# Patient Record
Sex: FEMALE | Race: WHITE | NOT HISPANIC OR LATINO | Employment: OTHER | URBAN - METROPOLITAN AREA
[De-identification: names, ages, dates, MRNs, and addresses within clinical notes are randomized per-mention and may not be internally consistent; named-entity substitution may affect disease eponyms.]

---

## 2019-02-20 LAB
CALCIUM SERPL-MCNC: 8.8 MG/DL (ref 8.5–10.1)
CHLORIDE SERPL-SCNC: 105 MMOL/L (ref 98–107)
CO2 SERPL-SCNC: 25 MMOL/L (ref 21–32)
POTASSIUM SERPL-SCNC: 4.1 MMOL/L (ref 3.5–5.1)
SODIUM SERPL-SCNC: 138 MEQ/L (ref 131–145)

## 2019-03-15 LAB
BUN SERPL-MCNC: 12 MG/DL (ref 7–18)
CREAT SERPL-MCNC: 0.8 MG/DL (ref 0.6–1.3)
GLUCOSE SERPL-MCNC: 85 MG/DL (ref 74–106)

## 2022-04-09 ENCOUNTER — HISTORICAL (OUTPATIENT)
Dept: ADMINISTRATIVE | Facility: HOSPITAL | Age: 87
End: 2022-04-09

## 2022-04-25 VITALS
OXYGEN SATURATION: 98 % | HEIGHT: 64 IN | WEIGHT: 132.63 LBS | SYSTOLIC BLOOD PRESSURE: 139 MMHG | BODY MASS INDEX: 22.64 KG/M2 | DIASTOLIC BLOOD PRESSURE: 76 MMHG

## 2022-09-15 ENCOUNTER — HISTORICAL (OUTPATIENT)
Dept: ADMINISTRATIVE | Facility: HOSPITAL | Age: 87
End: 2022-09-15

## 2023-01-30 DIAGNOSIS — R13.14 DYSPHAGIA, PHARYNGOESOPHAGEAL PHASE: Primary | ICD-10-CM

## 2023-01-31 ENCOUNTER — HOSPITAL ENCOUNTER (OUTPATIENT)
Dept: RADIOLOGY | Facility: HOSPITAL | Age: 88
Discharge: HOME | End: 2023-01-31
Attending: NURSE PRACTITIONER
Payer: MEDICARE

## 2023-01-31 DIAGNOSIS — R13.14 DYSPHAGIA, PHARYNGOESOPHAGEAL PHASE: ICD-10-CM

## 2023-01-31 PROCEDURE — A9698 NON-RAD CONTRAST MATERIALNOC: HCPCS | Performed by: NURSE PRACTITIONER

## 2023-01-31 PROCEDURE — 25500020 PHARM REV CODE 255: Performed by: NURSE PRACTITIONER

## 2023-01-31 PROCEDURE — 74220 X-RAY XM ESOPHAGUS 1CNTRST: CPT | Mod: TC

## 2023-01-31 RX ADMIN — BARIUM SULFATE 50 ML: 980 POWDER, FOR SUSPENSION ORAL at 11:01

## 2023-01-31 RX ADMIN — BARIUM SULFATE 50 ML: 0.6 SUSPENSION ORAL at 11:01

## 2023-06-17 ENCOUNTER — OFFICE VISIT (OUTPATIENT)
Dept: URGENT CARE | Facility: CLINIC | Age: 88
End: 2023-06-17
Payer: MEDICARE

## 2023-06-17 VITALS
WEIGHT: 145 LBS | BODY MASS INDEX: 25.69 KG/M2 | TEMPERATURE: 98 F | OXYGEN SATURATION: 95 % | HEART RATE: 73 BPM | HEIGHT: 63 IN | SYSTOLIC BLOOD PRESSURE: 138 MMHG | DIASTOLIC BLOOD PRESSURE: 83 MMHG | RESPIRATION RATE: 20 BRPM

## 2023-06-17 DIAGNOSIS — L29.9 ITCHING: Primary | ICD-10-CM

## 2023-06-17 PROCEDURE — 96372 PR INJECTION,THERAP/PROPH/DIAG2ST, IM OR SUBCUT: ICD-10-PCS | Mod: ,,,

## 2023-06-17 PROCEDURE — 99203 OFFICE O/P NEW LOW 30 MIN: CPT | Mod: 25,,,

## 2023-06-17 PROCEDURE — 96372 THER/PROPH/DIAG INJ SC/IM: CPT | Mod: ,,,

## 2023-06-17 PROCEDURE — 99203 PR OFFICE/OUTPT VISIT, NEW, LEVL III, 30-44 MIN: ICD-10-PCS | Mod: 25,,,

## 2023-06-17 RX ORDER — CYCLOBENZAPRINE HCL 10 MG
10 TABLET ORAL 2 TIMES DAILY PRN
COMMUNITY

## 2023-06-17 RX ORDER — TRAZODONE HYDROCHLORIDE 50 MG/1
TABLET ORAL
COMMUNITY
Start: 2023-03-27 | End: 2023-08-14

## 2023-06-17 RX ORDER — PANTOPRAZOLE SODIUM 40 MG/1
40 TABLET, DELAYED RELEASE ORAL 2 TIMES DAILY
COMMUNITY
Start: 2023-04-10

## 2023-06-17 RX ORDER — MIRTAZAPINE 15 MG/1
15 TABLET, FILM COATED ORAL NIGHTLY
COMMUNITY
Start: 2023-04-26

## 2023-06-17 RX ORDER — FLUTICASONE PROPIONATE 50 MCG
2 SPRAY, SUSPENSION (ML) NASAL
COMMUNITY
Start: 2023-04-15

## 2023-06-17 RX ORDER — TRAMADOL HYDROCHLORIDE 50 MG/1
50 TABLET ORAL DAILY PRN
COMMUNITY

## 2023-06-17 RX ORDER — SUCRALFATE 1 G/10ML
SUSPENSION ORAL
COMMUNITY
Start: 2023-02-07 | End: 2023-08-14

## 2023-06-17 RX ORDER — HYDROXYZINE PAMOATE 25 MG/1
25 CAPSULE ORAL EVERY 8 HOURS PRN
Qty: 9 CAPSULE | Refills: 0 | Status: SHIPPED | OUTPATIENT
Start: 2023-06-17 | End: 2023-06-20

## 2023-06-17 RX ORDER — DICLOFENAC SODIUM 75 MG/1
75 TABLET, DELAYED RELEASE ORAL 2 TIMES DAILY
COMMUNITY
Start: 2023-03-25

## 2023-06-17 RX ORDER — DOXYCYCLINE 100 MG/1
100 CAPSULE ORAL 2 TIMES DAILY
COMMUNITY
Start: 2023-06-09 | End: 2023-08-14

## 2023-06-17 RX ORDER — DEXAMETHASONE SODIUM PHOSPHATE 100 MG/10ML
5 INJECTION INTRAMUSCULAR; INTRAVENOUS ONCE
Status: COMPLETED | OUTPATIENT
Start: 2023-06-17 | End: 2023-06-17

## 2023-06-17 RX ORDER — LEVOTHYROXINE SODIUM 50 UG/1
50 TABLET ORAL
COMMUNITY
End: 2023-08-14

## 2023-06-17 RX ORDER — MEMANTINE HYDROCHLORIDE 10 MG/1
10 TABLET ORAL 2 TIMES DAILY
COMMUNITY
Start: 2023-05-11

## 2023-06-17 RX ORDER — VALACYCLOVIR HYDROCHLORIDE 1 G/1
TABLET, FILM COATED ORAL
COMMUNITY
Start: 2023-01-27 | End: 2023-08-14

## 2023-06-17 RX ADMIN — DEXAMETHASONE SODIUM PHOSPHATE 5 MG: 100 INJECTION INTRAMUSCULAR; INTRAVENOUS at 11:06

## 2023-06-17 NOTE — PATIENT INSTRUCTIONS
She given steroid shot while in clinic.    Vistaril every 8 hours as needed for itching.  Monitor blood pressure while taking this and do not drive or do any strenuous activities as this may make you sleepy.     Go directly to emergency room with any throat swelling, shortness of breath or other worrisome symptoms.

## 2023-06-17 NOTE — PROGRESS NOTES
"Subjective:      Patient ID: Karina Chun is a 89 y.o. female.    Vitals:  height is 5' 3" (1.6 m) and weight is 65.8 kg (145 lb). Her oral temperature is 97.8 °F (36.6 °C). Her blood pressure is 138/83 and her pulse is 73. Her respiration is 20 and oxygen saturation is 95%.     Chief Complaint: Allergic Reaction (Itching all over from shingle vaccination on Monday)    Patient is an 89-year-old female brought in by daughter this states she is itching all over from her shingles vaccine given to her yesterday.  Denies any shortness of breath, throat swelling, rash at this time.     Allergic Reaction  Pertinent negatives include no rash.     Skin:  Negative for rash and hives.   Allergic/Immunologic: Negative for hives.    Objective:     Physical Exam   Constitutional: She is oriented to person, place, and time.   HENT:   Head: Normocephalic.   Nose: Nose normal.   Mouth/Throat: Mucous membranes are moist. No posterior oropharyngeal erythema. Oropharynx is clear.   Cardiovascular: Normal rate and normal pulses.   Pulmonary/Chest: Effort normal.   Abdominal: Normal appearance.   Neurological: She is alert and oriented to person, place, and time.   Skin: Skin is warm and no rash. Capillary refill takes less than 2 seconds.   Psychiatric: Her behavior is normal. Mood, judgment and thought content normal.     Assessment:     1. Itching        Plan:       Itching  -     hydrOXYzine pamoate (VISTARIL) 25 MG Cap; Take 1 capsule (25 mg total) by mouth every 8 (eight) hours as needed (itching).  Dispense: 9 capsule; Refill: 0    Other orders  -     dexAMETHasone injection 5 mg      She given steroid shot while in clinic.    Vistaril every 8 hours as needed for itching.  Monitor blood pressure while taking this and do not drive or do any strenuous activities as this may make you sleepy.     Go directly to emergency room with any throat swelling, shortness of breath or other worrisome symptoms.              "

## 2023-07-20 DIAGNOSIS — D47.2 MONOCLONAL GAMMOPATHY: Primary | ICD-10-CM

## 2023-07-24 DIAGNOSIS — D47.2 MONOCLONAL GAMMOPATHY: Primary | ICD-10-CM

## 2023-08-08 NOTE — PROGRESS NOTES
Subjective:       Patient ID: Karina Chun is a 89 y.o. female.    Chief Complaint: Abnormal bloodwork    Diagnosis: Monoclonal gammopathy    Current Treatment: New patient visit    Clinical History:  Patient had a wellness visit 5/23/23.  Laboratory testing showed mild anemia with a hemoglobin of 10.8 and hematocrit 33.8 with normal red cell indices.  Total protein was elevated at 8.7 with a globulin fraction of 4.7.  BUN 18 and creatinine 1.12, serum calcium 9.0.  B12 and serum ferritin levels were normal.  SPEP drawn 6/16/23 showed a monoclonal spike in the gamma region measuring 2.3 gm/dL.  No IEP or quantitative immunoglobulins done.    She presents today for a hematology evaluation accompanied by her , daughter and son.  She lives in assisted living with her .  She has significant dementia and cannot give reliable history.  Family reports no significant illnesses or infections within the recent past.  No appetite changes or weight loss.  No complaints of bone pain.    Interval History  New patient visit    Review of Systems   Constitutional:  Negative for appetite change, fatigue, fever and unexpected weight change.   HENT:  Negative for mouth sores, sore throat and trouble swallowing.    Eyes: Negative.    Respiratory:  Negative for cough and shortness of breath.    Cardiovascular:  Negative for chest pain, palpitations and leg swelling.   Gastrointestinal:  Negative for abdominal distention, abdominal pain, constipation, diarrhea, nausea and vomiting.   Genitourinary:  Negative for dysuria, frequency and urgency.   Musculoskeletal:  Positive for arthralgias. Negative for back pain.   Integumentary:  Negative for pallor and rash.   Neurological:  Positive for memory loss. Negative for dizziness, weakness, numbness and headaches.   Hematological:  Negative for adenopathy. Does not bruise/bleed easily.   Psychiatric/Behavioral: Negative.         PMHx:  Hypothyroidism, osteoarthritis, migraines,  "dementia, CRI  PSHx:  Tonsils  SH:  Lifetime nonsmoker, no significant alcohol use.  Lives in assisted living in [place] with her .  FH:  No family history of cancer.    Objective:        BP (!) 131/91   Pulse 79   Temp 98 °F (36.7 °C)   Resp 14   Ht 5' 7" (1.702 m)   Wt 73.6 kg (162 lb 4.8 oz)   SpO2 95%   BMI 25.42 kg/m²    Physical Exam  Constitutional:       Comments: Elderly, well-developed white female in NAD, ambulatory without assistance   HENT:      Head: Normocephalic.      Mouth/Throat:      Mouth: Mucous membranes are moist.      Pharynx: Oropharynx is clear. No posterior oropharyngeal erythema.   Eyes:      Extraocular Movements: Extraocular movements intact.      Conjunctiva/sclera: Conjunctivae normal.      Pupils: Pupils are equal, round, and reactive to light.   Cardiovascular:      Rate and Rhythm: Normal rate and regular rhythm.      Heart sounds: No murmur heard.  Pulmonary:      Comments: Lungs clear to auscultation  Abdominal:      General: Bowel sounds are normal. There is no distension.      Palpations: Abdomen is soft.      Tenderness: There is no abdominal tenderness.      Comments: No palpable masses or organomegaly   Musculoskeletal:         General: Swelling (Trace ankle edema) present. No tenderness. Normal range of motion.      Cervical back: Neck supple. No tenderness.   Lymphadenopathy:      Cervical: No cervical adenopathy.   Skin:     General: Skin is warm and dry.      Findings: No rash.   Neurological:      General: No focal deficit present.      Mental Status: She is alert and oriented to person, place, and time.      Cranial Nerves: No cranial nerve deficit.      Motor: No weakness.       ECOG SCORE    1 - Restricted in strenuous activity-ambulatory and able to carry out work of a light nature          LABORATORY  Recent Results (from the past 168 hour(s))   Beta-2 Microglobulin, Serum    Collection Time: 08/14/23  2:19 PM   Result Value Ref Range    Beta 2 " Microglobulin 2.780 (H) 0.970 - 2.640 mg/L   CBC with Differential    Collection Time: 08/14/23  2:19 PM   Result Value Ref Range    WBC 5.60 4.50 - 11.50 x10(3)/mcL    RBC 3.74 (L) 4.20 - 5.40 x10(6)/mcL    Hgb 11.1 (L) 12.0 - 16.0 g/dL    Hct 34.8 (L) 37.0 - 47.0 %    MCV 93.0 80.0 - 94.0 fL    MCH 29.7 27.0 - 31.0 pg    MCHC 31.9 (L) 33.0 - 36.0 g/dL    RDW 13.8 11.5 - 17.0 %    Platelet 235 130 - 400 x10(3)/mcL    MPV 8.5 7.4 - 10.4 fL    Neut % 49.6 %    Lymph % 41.3 %    Mono % 7.1 %    Eos % 1.4 %    Basophil % 0.4 %    Lymph # 2.31 0.6 - 4.6 x10(3)/mcL    Neut # 2.78 2.1 - 9.2 x10(3)/mcL    Mono # 0.40 0.1 - 1.3 x10(3)/mcL    Eos # 0.08 0 - 0.9 x10(3)/mcL    Baso # 0.02 <=0.2 x10(3)/mcL    IG# 0.01 0 - 0.04 x10(3)/mcL    IG% 0.2 %        Assessment:   Monoclonal gammopathy  Anemia of chronic renal disease  Chronic renal insufficiency      Plan:   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  Additional laboratory drawn today for IEP, quantitative immunoglobulins and free serum light chains.  I will notify her son of the results when finalized.  The family does not want to pursue aggressive workup including bone marrow aspiration and biopsy.  She does not have any evidence of progressive renal insufficiency, anemia, hypercalcemia or bone pain.  As long as her laboratory testing is acceptable, will anticipate follow-up testing in 4 months.  The treatment plan was reviewed and discussed with the patient and her family in detail.  All questions were answered.      LARS DIOR MD    Other Physicians  Dr. Juli Logan

## 2023-08-14 ENCOUNTER — OFFICE VISIT (OUTPATIENT)
Dept: HEMATOLOGY/ONCOLOGY | Facility: CLINIC | Age: 88
End: 2023-08-14
Payer: MEDICARE

## 2023-08-14 ENCOUNTER — TELEPHONE (OUTPATIENT)
Dept: HEMATOLOGY/ONCOLOGY | Facility: CLINIC | Age: 88
End: 2023-08-14
Payer: MEDICARE

## 2023-08-14 VITALS
RESPIRATION RATE: 14 BRPM | DIASTOLIC BLOOD PRESSURE: 91 MMHG | WEIGHT: 162.31 LBS | OXYGEN SATURATION: 95 % | SYSTOLIC BLOOD PRESSURE: 131 MMHG | HEIGHT: 67 IN | HEART RATE: 79 BPM | TEMPERATURE: 98 F | BODY MASS INDEX: 25.47 KG/M2

## 2023-08-14 DIAGNOSIS — D47.2 MONOCLONAL GAMMOPATHY: Primary | ICD-10-CM

## 2023-08-14 LAB
ALBUMIN SERPL-MCNC: 3.8 G/DL (ref 3.4–4.8)
ALBUMIN/GLOB SERPL: 0.8 RATIO (ref 1.1–2)
ALP SERPL-CCNC: 77 UNIT/L (ref 40–150)
ALT SERPL-CCNC: 14 UNIT/L (ref 0–55)
AST SERPL-CCNC: 20 UNIT/L (ref 5–34)
B2 MICROGLOB SERPL-MCNC: 2.78 MG/L (ref 0.97–2.64)
BASOPHILS # BLD AUTO: 0.02 X10(3)/MCL
BASOPHILS NFR BLD AUTO: 0.4 %
BILIRUB SERPL-MCNC: 0.6 MG/DL
BUN SERPL-MCNC: 18.4 MG/DL (ref 9.8–20.1)
CALCIUM SERPL-MCNC: 9.4 MG/DL (ref 8.4–10.2)
CHLORIDE SERPL-SCNC: 108 MMOL/L (ref 98–107)
CO2 SERPL-SCNC: 25 MMOL/L (ref 23–31)
CREAT SERPL-MCNC: 0.96 MG/DL (ref 0.55–1.02)
EOSINOPHIL # BLD AUTO: 0.08 X10(3)/MCL (ref 0–0.9)
EOSINOPHIL NFR BLD AUTO: 1.4 %
ERYTHROCYTE [DISTWIDTH] IN BLOOD BY AUTOMATED COUNT: 13.8 % (ref 11.5–17)
GFR SERPLBLD CREATININE-BSD FMLA CKD-EPI: 57 MLS/MIN/1.73/M2
GLOBULIN SER-MCNC: 4.6 GM/DL (ref 2.4–3.5)
GLUCOSE SERPL-MCNC: 88 MG/DL (ref 82–115)
HCT VFR BLD AUTO: 34.8 % (ref 37–47)
HGB BLD-MCNC: 11.1 G/DL (ref 12–16)
IGA SERPL-MCNC: 10 MG/DL (ref 69–517)
IGG SERPL-MCNC: 3438 MG/DL (ref 522–1631)
IGM SERPL-MCNC: 26 MG/DL (ref 33–293)
IMM GRANULOCYTES # BLD AUTO: 0.01 X10(3)/MCL (ref 0–0.04)
IMM GRANULOCYTES NFR BLD AUTO: 0.2 %
LYMPHOCYTES # BLD AUTO: 2.31 X10(3)/MCL (ref 0.6–4.6)
LYMPHOCYTES NFR BLD AUTO: 41.3 %
MCH RBC QN AUTO: 29.7 PG (ref 27–31)
MCHC RBC AUTO-ENTMCNC: 31.9 G/DL (ref 33–36)
MCV RBC AUTO: 93 FL (ref 80–94)
MONOCYTES # BLD AUTO: 0.4 X10(3)/MCL (ref 0.1–1.3)
MONOCYTES NFR BLD AUTO: 7.1 %
NEUTROPHILS # BLD AUTO: 2.78 X10(3)/MCL (ref 2.1–9.2)
NEUTROPHILS NFR BLD AUTO: 49.6 %
PLATELET # BLD AUTO: 235 X10(3)/MCL (ref 130–400)
PMV BLD AUTO: 8.5 FL (ref 7.4–10.4)
POTASSIUM SERPL-SCNC: 4.1 MMOL/L (ref 3.5–5.1)
PROT SERPL-MCNC: 8.4 GM/DL (ref 5.8–7.6)
RBC # BLD AUTO: 3.74 X10(6)/MCL (ref 4.2–5.4)
SODIUM SERPL-SCNC: 141 MMOL/L (ref 136–145)
WBC # SPEC AUTO: 5.6 X10(3)/MCL (ref 4.5–11.5)

## 2023-08-14 PROCEDURE — 85025 COMPLETE CBC W/AUTO DIFF WBC: CPT | Performed by: INTERNAL MEDICINE

## 2023-08-14 PROCEDURE — 99999 PR PBB SHADOW E&M-EST. PATIENT-LVL V: CPT | Mod: PBBFAC,,, | Performed by: INTERNAL MEDICINE

## 2023-08-14 PROCEDURE — 82784 ASSAY IGA/IGD/IGG/IGM EACH: CPT | Performed by: INTERNAL MEDICINE

## 2023-08-14 PROCEDURE — 99999 PR PBB SHADOW E&M-EST. PATIENT-LVL V: ICD-10-PCS | Mod: PBBFAC,,, | Performed by: INTERNAL MEDICINE

## 2023-08-14 PROCEDURE — 83521 IG LIGHT CHAINS FREE EACH: CPT | Performed by: INTERNAL MEDICINE

## 2023-08-14 PROCEDURE — 99204 PR OFFICE/OUTPT VISIT, NEW, LEVL IV, 45-59 MIN: ICD-10-PCS | Mod: S$PBB,,, | Performed by: INTERNAL MEDICINE

## 2023-08-14 PROCEDURE — 86146 BETA-2 GLYCOPROTEIN ANTIBODY: CPT | Performed by: INTERNAL MEDICINE

## 2023-08-14 PROCEDURE — 99215 OFFICE O/P EST HI 40 MIN: CPT | Mod: PBBFAC | Performed by: INTERNAL MEDICINE

## 2023-08-14 PROCEDURE — 84165 PROTEIN E-PHORESIS SERUM: CPT | Performed by: INTERNAL MEDICINE

## 2023-08-14 PROCEDURE — 86334 IMMUNOFIX E-PHORESIS SERUM: CPT | Performed by: INTERNAL MEDICINE

## 2023-08-14 PROCEDURE — 80053 COMPREHEN METABOLIC PANEL: CPT | Performed by: INTERNAL MEDICINE

## 2023-08-14 PROCEDURE — 99204 OFFICE O/P NEW MOD 45 MIN: CPT | Mod: S$PBB,,, | Performed by: INTERNAL MEDICINE

## 2023-08-14 PROCEDURE — 36415 COLL VENOUS BLD VENIPUNCTURE: CPT | Performed by: INTERNAL MEDICINE

## 2023-08-14 RX ORDER — MAGNESIUM 30 MG
TABLET ORAL ONCE
COMMUNITY

## 2023-08-14 RX ORDER — SUMATRIPTAN SUCCINATE 25 MG/1
50 TABLET ORAL
COMMUNITY

## 2023-08-14 RX ORDER — WITCH HAZEL 50 %
2000 PADS, MEDICATED (EA) TOPICAL DAILY
COMMUNITY

## 2023-08-14 RX ORDER — LEVOCETIRIZINE DIHYDROCHLORIDE 5 MG/1
5 TABLET, FILM COATED ORAL NIGHTLY
COMMUNITY

## 2023-08-14 RX ORDER — ACETAMINOPHEN 500 MG
5000 TABLET ORAL DAILY
COMMUNITY

## 2023-08-14 RX ORDER — KRILL/OM-3/DHA/EPA/PHOSPHO/AST 500-150-45
CAPSULE ORAL
COMMUNITY

## 2023-08-14 NOTE — LETTER
August 14, 2023        Shireen Logan MD  [address]  [place] [address]             [facility]  [address], [address]  [facility] [address]  Phone: [phone]   Patient: Karina Chun   MR Number: [id number]   YOB: 1933   Date of Visit: 8/14/2023       Dear Dr. Logan:    Thank you for referring Karina Chun to me for evaluation. Attached you will find relevant portions of my assessment and plan of care.    If you have questions, please do not hesitate to call me. I look forward to following Karina Chun along with you.    Sincerely,      Reji Tripp MD            CC  No Recipients    Enclosure

## 2023-08-15 LAB
ALBUMIN % SPEP (OHS): 44.08
ALBUMIN SERPL-MCNC: 3.7 G/DL (ref 3.4–4.8)
ALBUMIN/GLOB SERPL: 0.8 RATIO (ref 1.1–2)
ALPHA 1 GLOB (OHS): 0.27 GM/DL
ALPHA 1 GLOB% (OHS): 3.21
ALPHA 2 GLOB % (OHS): 10.96
ALPHA 2 GLOB (OHS): 0.92 GM/DL
BETA GLOB (OHS): 1 GM/DL
BETA GLOB% (OHS): 11.94
GAMMA GLOBULIN % (OHS): 29.81
GAMMA GLOBULIN (OHS): 2.5 GM/DL
GLOBULIN SER-MCNC: 4.7 GM/DL (ref 2.4–3.5)
KAPPA LC FREE SER-MCNC: 5.76 MG/DL (ref 0.33–1.94)
KAPPA LC FREE/LAMBDA FREE SER: 7.29 {RATIO} (ref 0.26–1.65)
LAMBDA LC FREE SERPL-MCNC: 0.79 MG/DL (ref 0.57–2.63)
M SPIKE % (OHS): 21.94
M SPIKE (OHS): 1.84 GM/DL
PATH REV: NORMAL
PROT SERPL-MCNC: 8.4 GM/DL (ref 5.8–7.6)

## 2023-11-27 ENCOUNTER — LAB VISIT (OUTPATIENT)
Dept: LAB | Facility: HOSPITAL | Age: 88
End: 2023-11-27
Attending: INTERNAL MEDICINE
Payer: MEDICARE

## 2023-11-27 DIAGNOSIS — D47.2 MONOCLONAL GAMMOPATHY: ICD-10-CM

## 2023-11-27 LAB
ALBUMIN SERPL-MCNC: 3.6 G/DL (ref 3.4–4.8)
ALBUMIN/GLOB SERPL: 0.8 RATIO (ref 1.1–2)
ALP SERPL-CCNC: 75 UNIT/L (ref 40–150)
ALT SERPL-CCNC: 18 UNIT/L (ref 0–55)
AST SERPL-CCNC: 22 UNIT/L (ref 5–34)
B2 MICROGLOB SERPL-MCNC: 2.45 MG/L (ref 0.97–2.64)
BASOPHILS # BLD AUTO: 0.02 X10(3)/MCL
BASOPHILS NFR BLD AUTO: 0.5 %
BILIRUB SERPL-MCNC: 0.6 MG/DL
BUN SERPL-MCNC: 15.1 MG/DL (ref 9.8–20.1)
CALCIUM SERPL-MCNC: 8.9 MG/DL (ref 8.4–10.2)
CHLORIDE SERPL-SCNC: 108 MMOL/L (ref 98–111)
CO2 SERPL-SCNC: 28 MMOL/L (ref 23–31)
CREAT SERPL-MCNC: 0.99 MG/DL (ref 0.55–1.02)
EOSINOPHIL # BLD AUTO: 0.05 X10(3)/MCL (ref 0–0.9)
EOSINOPHIL NFR BLD AUTO: 1.1 %
ERYTHROCYTE [DISTWIDTH] IN BLOOD BY AUTOMATED COUNT: 13.6 % (ref 11.5–17)
GFR SERPLBLD CREATININE-BSD FMLA CKD-EPI: 54 MLS/MIN/1.73/M2
GLOBULIN SER-MCNC: 4.3 GM/DL (ref 2.4–3.5)
GLUCOSE SERPL-MCNC: 75 MG/DL (ref 75–121)
HCT VFR BLD AUTO: 36.1 % (ref 37–47)
HGB BLD-MCNC: 11.2 G/DL (ref 12–16)
IGA SERPL-MCNC: 8 MG/DL (ref 69–517)
IGG SERPL-MCNC: 2908 MG/DL (ref 522–1631)
IGM SERPL-MCNC: 29 MG/DL (ref 33–293)
IMM GRANULOCYTES # BLD AUTO: 0.01 X10(3)/MCL (ref 0–0.04)
IMM GRANULOCYTES NFR BLD AUTO: 0.2 %
LYMPHOCYTES # BLD AUTO: 1.95 X10(3)/MCL (ref 0.6–4.6)
LYMPHOCYTES NFR BLD AUTO: 43.9 %
MCH RBC QN AUTO: 28.9 PG (ref 27–31)
MCHC RBC AUTO-ENTMCNC: 31 G/DL (ref 33–36)
MCV RBC AUTO: 93.3 FL (ref 80–94)
MONOCYTES # BLD AUTO: 0.29 X10(3)/MCL (ref 0.1–1.3)
MONOCYTES NFR BLD AUTO: 6.5 %
NEUTROPHILS # BLD AUTO: 2.12 X10(3)/MCL (ref 2.1–9.2)
NEUTROPHILS NFR BLD AUTO: 47.8 %
PLATELET # BLD AUTO: 229 X10(3)/MCL (ref 130–400)
PMV BLD AUTO: 8.8 FL (ref 7.4–10.4)
POTASSIUM SERPL-SCNC: 4.3 MMOL/L (ref 3.5–5.1)
PROT SERPL-MCNC: 7.9 GM/DL (ref 5.8–7.6)
RBC # BLD AUTO: 3.87 X10(6)/MCL (ref 4.2–5.4)
SODIUM SERPL-SCNC: 139 MMOL/L (ref 132–146)
WBC # SPEC AUTO: 4.44 X10(3)/MCL (ref 4.5–11.5)

## 2023-11-27 PROCEDURE — 84165 PROTEIN E-PHORESIS SERUM: CPT

## 2023-11-27 PROCEDURE — 86334 IMMUNOFIX E-PHORESIS SERUM: CPT

## 2023-11-27 PROCEDURE — 80053 COMPREHEN METABOLIC PANEL: CPT

## 2023-11-27 PROCEDURE — 85025 COMPLETE CBC W/AUTO DIFF WBC: CPT

## 2023-11-27 PROCEDURE — 86146 BETA-2 GLYCOPROTEIN ANTIBODY: CPT

## 2023-11-27 PROCEDURE — 36415 COLL VENOUS BLD VENIPUNCTURE: CPT

## 2023-11-27 PROCEDURE — 82784 ASSAY IGA/IGD/IGG/IGM EACH: CPT

## 2023-11-27 PROCEDURE — 83521 IG LIGHT CHAINS FREE EACH: CPT

## 2023-11-28 LAB
ALBUMIN % SPEP (OHS): 47.07
ALBUMIN SERPL-MCNC: 3.7 G/DL (ref 3.4–4.8)
ALBUMIN/GLOB SERPL: 0.9 RATIO (ref 1.1–2)
ALPHA 1 GLOB (OHS): 0.22 GM/DL
ALPHA 1 GLOB% (OHS): 2.74
ALPHA 2 GLOB % (OHS): 9.66
ALPHA 2 GLOB (OHS): 0.76 GM/DL
BETA GLOB (OHS): 0.89 GM/DL
BETA GLOB% (OHS): 11.31
GAMMA GLOBULIN % (OHS): 29.22
GAMMA GLOBULIN (OHS): 2.31 GM/DL
GLOBULIN SER-MCNC: 4.2 GM/DL (ref 2.4–3.5)
KAPPA LC FREE SER-MCNC: 4.85 MG/DL (ref 0.33–1.94)
KAPPA LC FREE/LAMBDA FREE SER: 8.08 {RATIO} (ref 0.26–1.65)
LAMBDA LC FREE SERPL-MCNC: 0.6 MG/DL (ref 0.57–2.63)
M SPIKE % (OHS): 20.25
M SPIKE (OHS): 1.6 GM/DL
PATH REV: NORMAL
PROT SERPL-MCNC: 7.9 GM/DL (ref 5.8–7.6)

## 2023-12-07 NOTE — PROGRESS NOTES
Subjective:       Patient ID: Karina Chun is a 89 y.o. female.    Chief Complaint:  I feel okay    Diagnosis: Monoclonal gammopathy    Current Treatment: Observation    Clinical History:  Patient had a wellness visit 5/23/23.  Laboratory testing showed mild anemia with a hemoglobin of 10.8 and hematocrit 33.8 with normal red cell indices.  Total protein was elevated at 8.7 with a globulin fraction of 4.7.  BUN 18 and creatinine 1.12, serum calcium 9.0.  B12 and serum ferritin levels were normal.  SPEP drawn 6/16/23 showed a monoclonal spike in the gamma region measuring 2.3 gm/dL.  No IEP or quantitative immunoglobulins done.    She was seen as a new patient 8/14/23 for a hematology evaluation.  She lives in an assisted living complex with her .  She has significant dementia and could not give a reliable history.  Family reported no recent illnesses or infections.  No appetite changes or weight loss.  No fever, chills or night sweats.  No complaints of bone pain.  An initial period of observation was recommended.    Interval History  She returns to the office today for a four-month follow-up visit accompanied by her daughter.  She has no new symptoms or complaints.  She is not had any recent illnesses or infections.  No bone pain.  No weight loss.  Laboratory testing shows interval decrease in her monoclonal IgG level.    Review of Systems   Constitutional:  Negative for appetite change, fatigue, fever and unexpected weight change.   HENT:  Negative for mouth sores, sore throat and trouble swallowing.    Eyes: Negative.    Respiratory:  Negative for cough and shortness of breath.    Cardiovascular:  Negative for chest pain, palpitations and leg swelling.   Gastrointestinal:  Negative for abdominal distention, abdominal pain, constipation, diarrhea, nausea and vomiting.   Genitourinary:  Negative for dysuria, frequency and urgency.   Musculoskeletal:  Positive for arthralgias. Negative for back pain.  "  Integumentary:  Negative for pallor and rash.   Neurological:  Positive for memory loss. Negative for dizziness, weakness, numbness and headaches.   Hematological:  Negative for adenopathy. Does not bruise/bleed easily.   Psychiatric/Behavioral: Negative.         PMHx:  Hypothyroidism, osteoarthritis, migraines, dementia, CRI  PSHx:  Tonsils  SH:  Lifetime nonsmoker, no significant alcohol use.  Lives in assisted living in [place] with her .  FH:  No family history of cancer.    Objective:        /85   Pulse 85   Temp 98.2 °F (36.8 °C)   Resp 16   Ht 5' 5" (1.651 m)   Wt 75.8 kg (167 lb)   SpO2 96%   BMI 27.79 kg/m²    Physical Exam  Constitutional:       Comments: Elderly, well-developed white female in NAD, ambulatory without assistance   HENT:      Head: Normocephalic.      Mouth/Throat:      Mouth: Mucous membranes are moist.      Pharynx: Oropharynx is clear. No posterior oropharyngeal erythema.   Eyes:      Extraocular Movements: Extraocular movements intact.      Conjunctiva/sclera: Conjunctivae normal.      Pupils: Pupils are equal, round, and reactive to light.   Cardiovascular:      Rate and Rhythm: Normal rate and regular rhythm.      Heart sounds: No murmur heard.  Pulmonary:      Comments: Lungs clear to auscultation  Abdominal:      General: Bowel sounds are normal. There is no distension.      Palpations: Abdomen is soft.      Tenderness: There is no abdominal tenderness.      Comments: No palpable masses or organomegaly   Musculoskeletal:         General: Swelling (Trace ankle edema) present. No tenderness. Normal range of motion.      Cervical back: Neck supple. No tenderness.   Lymphadenopathy:      Cervical: No cervical adenopathy.   Skin:     General: Skin is warm and dry.      Findings: No rash.   Neurological:      General: No focal deficit present.      Mental Status: She is alert and oriented to person, place, and time.      Cranial Nerves: No cranial nerve deficit.      " Motor: No weakness.       ECOG SCORE    0 - Fully active-able to carry on all pre-disease performance without restriction          LABORATORY  No results found for this or any previous visit (from the past 168 hour(s)).                   8/14/23 11/27/23  IgG          3438        2908  IgA              10              8  IgM              26            29  MS            1.84         1.60  B2M          2.78         2.45  FKLC        5.76         4.85  FLLC         0.79         0.60  Ratio         7.29         8.08      Assessment:   Monoclonal gammopathy of unknown significance  Anemia of chronic renal disease  Chronic renal insufficiency      Plan:   Laboratory testing shows interval decrease in her monoclonal IgG level.  She remains asymptomatic with no indications for treatment.    Ongoing observation is recommended.    RTC in 4 months for a follow-up visit and clinical exam with repeat laboratory.      LARS DIOR MD    Other Physicians  Dr. Juli Logan

## 2023-12-11 ENCOUNTER — OFFICE VISIT (OUTPATIENT)
Dept: HEMATOLOGY/ONCOLOGY | Facility: CLINIC | Age: 88
End: 2023-12-11
Payer: MEDICARE

## 2023-12-11 VITALS
HEART RATE: 85 BPM | BODY MASS INDEX: 27.82 KG/M2 | SYSTOLIC BLOOD PRESSURE: 137 MMHG | RESPIRATION RATE: 16 BRPM | TEMPERATURE: 98 F | HEIGHT: 65 IN | DIASTOLIC BLOOD PRESSURE: 85 MMHG | WEIGHT: 167 LBS | OXYGEN SATURATION: 96 %

## 2023-12-11 DIAGNOSIS — D47.2 MONOCLONAL GAMMOPATHY: Primary | ICD-10-CM

## 2023-12-11 PROCEDURE — 99214 OFFICE O/P EST MOD 30 MIN: CPT | Mod: PBBFAC | Performed by: INTERNAL MEDICINE

## 2023-12-11 PROCEDURE — 99999 PR PBB SHADOW E&M-EST. PATIENT-LVL IV: ICD-10-PCS | Mod: PBBFAC,,, | Performed by: INTERNAL MEDICINE

## 2023-12-11 PROCEDURE — 99214 PR OFFICE/OUTPT VISIT, EST, LEVL IV, 30-39 MIN: ICD-10-PCS | Mod: S$PBB,,, | Performed by: INTERNAL MEDICINE

## 2023-12-11 PROCEDURE — 99999 PR PBB SHADOW E&M-EST. PATIENT-LVL IV: CPT | Mod: PBBFAC,,, | Performed by: INTERNAL MEDICINE

## 2023-12-11 PROCEDURE — 99214 OFFICE O/P EST MOD 30 MIN: CPT | Mod: S$PBB,,, | Performed by: INTERNAL MEDICINE

## 2024-02-05 ENCOUNTER — OFFICE VISIT (OUTPATIENT)
Dept: URGENT CARE | Facility: CLINIC | Age: 89
End: 2024-02-05
Payer: MEDICARE

## 2024-02-05 VITALS
OXYGEN SATURATION: 96 % | DIASTOLIC BLOOD PRESSURE: 81 MMHG | SYSTOLIC BLOOD PRESSURE: 142 MMHG | HEIGHT: 66 IN | HEART RATE: 75 BPM | RESPIRATION RATE: 20 BRPM | WEIGHT: 150 LBS | BODY MASS INDEX: 24.11 KG/M2 | TEMPERATURE: 98 F

## 2024-02-05 DIAGNOSIS — U07.1 LAB TEST POSITIVE FOR DETECTION OF COVID-19 VIRUS: Primary | ICD-10-CM

## 2024-02-05 DIAGNOSIS — U07.1 COVID-19 VIRUS DETECTED: ICD-10-CM

## 2024-02-05 LAB
CTP QC/QA: YES
CTP QC/QA: YES
POC MOLECULAR INFLUENZA A AGN: NEGATIVE
POC MOLECULAR INFLUENZA B AGN: NEGATIVE
SARS-COV-2 RDRP RESP QL NAA+PROBE: POSITIVE

## 2024-02-05 PROCEDURE — 99213 OFFICE O/P EST LOW 20 MIN: CPT | Mod: ,,, | Performed by: FAMILY MEDICINE

## 2024-02-05 PROCEDURE — 87635 SARS-COV-2 COVID-19 AMP PRB: CPT | Mod: QW,,, | Performed by: FAMILY MEDICINE

## 2024-02-05 PROCEDURE — 87502 INFLUENZA DNA AMP PROBE: CPT | Mod: QW,,, | Performed by: FAMILY MEDICINE

## 2024-02-05 RX ORDER — TRAZODONE HYDROCHLORIDE 50 MG/1
TABLET ORAL
COMMUNITY
Start: 2023-12-17

## 2024-02-05 RX ORDER — NAPROXEN SODIUM 220 MG/1
81 TABLET, FILM COATED ORAL
COMMUNITY

## 2024-02-05 RX ORDER — SUCRALFATE 1 G/1
1 TABLET ORAL
COMMUNITY

## 2024-02-05 NOTE — PATIENT INSTRUCTIONS
Please give us a call when you determine whether not you want a prescription for Paxlovid    Drink plenty of fluids.      Get plenty of rest.      Tylenol or Motrin as needed.      Go to the ER with any significant change or worsening of symptoms.     Follow up with your primary care doctor.

## 2024-02-05 NOTE — PROGRESS NOTES
"Subjective:      Patient ID: Karina Chun is a 89 y.o. female.    Vitals:  height is 5' 6" (1.676 m) and weight is 68 kg (150 lb). Her oral temperature is 97.9 °F (36.6 °C). Her blood pressure is 142/81 (abnormal) and her pulse is 75. Her respiration is 20 and oxygen saturation is 96%.     Chief Complaint: Sinus Problem (Nasal congestion, vomited last night.  Nursing home called son and told him they needed to be tested for covid and flu)    HPI  ROS   Objective:     Physical Exam    Assessment:     1. Nasal congestion        Plan:       Nasal congestion  -     POCT COVID-19 Rapid Screening  -     POCT Influenza A/B MOLECULAR                    "

## 2024-02-05 NOTE — PROGRESS NOTES
Patient ID: [id number]     Chief Complaint: upper respiratory tract infection symptoms    History of Present Illness:     Karina Chun is a 89 y.o. female  who presents today for symptoms of Sinus Problem (Nasal congestion, vomited last night.  Nursing home called son and told him they needed to be tested for covid and flu)      Pt denies experiencing any fevers, chills, nausea, vomiting, difficulty breathing, dysphagia, or neck stiffness.    Past Medical History:     ----------------------------  Migraines  Thyroid disease     Past Surgical History:   Procedure Laterality Date    TONSILLECTOMY         Review of patient's allergies indicates:   Allergen Reactions    Codeine phosphate (bulk)     Moxifloxacin        Outpatient Medications Marked as Taking for the 2/5/24 encounter (Office Visit) with Jeff Nguyen MD   Medication Sig Dispense Refill    traZODone (DESYREL) 50 MG tablet Take by mouth.         Social History     Socioeconomic History    Marital status:    Tobacco Use    Smoking status: Never    Smokeless tobacco: Never   Substance and Sexual Activity    Alcohol use: Never    Drug use: Never        Family History   Problem Relation Age of Onset    Hypertension Mother     Hypertension Father     No Known Problems Sister     No Known Problems Brother     Cancer Neg Hx         Subjective:     Review of Systems   Constitutional:  Negative for chills, fever and malaise/fatigue.   HENT:  Positive for congestion. Negative for ear discharge, ear pain, sinus pain and sore throat.    Respiratory:  Negative for cough, sputum production, shortness of breath, wheezing and stridor.    Gastrointestinal:  Positive for vomiting. Negative for abdominal pain, diarrhea and nausea.   Genitourinary:  Negative for dysuria, frequency and urgency.   Musculoskeletal:  Negative for neck pain.   Skin:  Negative for rash.   Neurological:  Negative for headaches.       Objective:     Vitals:    02/05/24 1238   BP: (!)  142/81   Pulse: 75   Resp: 20   Temp: 97.9 °F (36.6 °C)     Body mass index is 24.21 kg/m².    Physical Exam  Constitutional:       General: She is not in acute distress.     Appearance: Normal appearance. She is not ill-appearing or toxic-appearing.   HENT:      Head: Normocephalic and atraumatic.      Right Ear: Tympanic membrane and ear canal normal.      Left Ear: Tympanic membrane and ear canal normal.      Nose: No congestion or rhinorrhea.      Mouth/Throat:      Pharynx: Oropharynx is clear. No oropharyngeal exudate or posterior oropharyngeal erythema.   Eyes:      General:         Right eye: No discharge.         Left eye: No discharge.      Extraocular Movements: Extraocular movements intact.      Conjunctiva/sclera: Conjunctivae normal.   Cardiovascular:      Rate and Rhythm: Normal rate and regular rhythm.      Heart sounds: Normal heart sounds. No murmur heard.     No gallop.   Pulmonary:      Effort: Pulmonary effort is normal. No respiratory distress.      Breath sounds: Normal breath sounds. No stridor. No wheezing, rhonchi or rales.   Chest:      Chest wall: No tenderness.   Musculoskeletal:      Cervical back: No rigidity or tenderness.   Lymphadenopathy:      Cervical: No cervical adenopathy.   Neurological:      Mental Status: She is alert and oriented to person, place, and time. Mental status is at baseline.   Psychiatric:         Mood and Affect: Mood normal.         Behavior: Behavior normal.         Assessment & Plan:       ICD-10-CM ICD-9-CM   1. Lab test positive for detection of COVID-19 virus  U07.1 079.89        1. Lab test positive for detection of COVID-19 virus  -     POCT COVID-19 Rapid Screening  -     POCT Influenza A/B MOLECULAR       Flu negative, COVID positive.    COVID CDC quarantine recommendations discussed.  Indications for Paxlovid discussed; indications here include age over 60, per Underlying Medical Conditions Associated with Higher Risk for Severe COVID-19: Information  for Healthcare Professionals  [facility]. Risks and benefits of Paxlovid discussed. Risks accepted, pt would like to try Paxlovid.  Last EGFR 57, renal dosing indicated    We discussed warning signs and symptoms to monitor for and to seek medical care if they emerge. Pt will return  if symptoms change, worsen, or do not resolved within the expected time range.     Here with son.  Called daughter who is a doctor on the phone during visit.  She will call PCP to determine whether or not they want to get a prescription for Paxlovid and son will call us back and let us know.

## 2024-03-05 ENCOUNTER — OFFICE VISIT (OUTPATIENT)
Dept: URGENT CARE | Facility: CLINIC | Age: 89
End: 2024-03-05
Payer: MEDICARE

## 2024-03-05 VITALS
TEMPERATURE: 98 F | SYSTOLIC BLOOD PRESSURE: 138 MMHG | HEIGHT: 66 IN | RESPIRATION RATE: 18 BRPM | HEART RATE: 75 BPM | BODY MASS INDEX: 24.11 KG/M2 | OXYGEN SATURATION: 96 % | DIASTOLIC BLOOD PRESSURE: 88 MMHG | WEIGHT: 150 LBS

## 2024-03-05 DIAGNOSIS — S51.811A SKIN TEAR OF RIGHT FOREARM WITHOUT COMPLICATION, INITIAL ENCOUNTER: Primary | ICD-10-CM

## 2024-03-05 PROCEDURE — 99203 OFFICE O/P NEW LOW 30 MIN: CPT | Mod: ,,, | Performed by: PHYSICIAN ASSISTANT

## 2024-03-05 RX ORDER — MUPIROCIN 20 MG/G
OINTMENT TOPICAL 3 TIMES DAILY
Qty: 15 G | Refills: 0 | Status: SHIPPED | OUTPATIENT
Start: 2024-03-05 | End: 2024-03-12

## 2024-03-05 RX ORDER — CEPHALEXIN 500 MG/1
500 CAPSULE ORAL EVERY 12 HOURS
Qty: 14 CAPSULE | Refills: 0 | Status: SHIPPED | OUTPATIENT
Start: 2024-03-05 | End: 2024-03-12

## 2024-03-05 NOTE — PATIENT INSTRUCTIONS
Recommend clean skin tear wound gently with soap and water then pat dry with towel.  Then may apply thin coat of triple antibiotic ointment or Bactroban antibiotic ointment with small Band-Aid application 1-2 times daily.  Anticipate skin tear wound delayed healing 1-2 weeks.  Recommend discontinue daily peroxide use.  Recommend Keflex antibiotic coverage.  Recommend primary care wound check in 1-2 weeks.

## 2024-03-05 NOTE — PROGRESS NOTES
"Subjective:      Patient ID: Karina Chun is a 89 y.o. female.    Vitals:  height is 5' 6" (1.676 m) and weight is 68 kg (150 lb). Her temperature is 98.1 °F (36.7 °C). Her blood pressure is 138/88 and her pulse is 75. Her respiration is 18 and oxygen saturation is 96%.     Chief Complaint: Wrist Injury (Pt presents to clinic with a cut on her right wrist. Pt states that she does no know how she received it. Symptomatic x 4 days. Pt has been putting hydrogen peroxide and neosporin of cut.)    HPI  elderly female transported by son from [facility] to urgent care for left forearm wound initial evaluation.  Son reports unknown injury to right forearm/wrist in the last 4 days monitored by assisted living and tended to by  applying peroxide over the last 4 days.  Son reports concern for surrounding redness and drainage concern for infection.   Wrist Injury     Additional comments: Pt presents to clinic with a cut on her right wrist.   Pt states that she does no know how she received it. Symptomatic x 4 days.   Pt has been putting hydrogen peroxide and neosporin of cut.    Wrist Injury   The incident occurred 3 to 5 days ago.       Constitution: Negative for fever.   Musculoskeletal:  Negative for joint pain, joint swelling and abnormal ROM of joint.   Skin:  Positive for wound and erythema.      Objective:     Physical Exam   Constitutional: She appears well-developed. She does not appear ill.      Comments:Awake alert pleasant ambulatory elderly female attended by son     HENT:   Head: Normocephalic and atraumatic. Head is without abrasion, without contusion and without laceration.   Mouth/Throat: Oropharynx is clear and moist and mucous membranes are normal.   Eyes: EOM and lids are normal.   Neck: Trachea normal and phonation normal. Neck supple.   Cardiovascular: Normal rate and normal pulses.   Pulmonary/Chest: Effort normal.   Musculoskeletal: Normal range of motion.         General: Signs of " injury present. No swelling or tenderness. Normal range of motion.      Right elbow: Normal.     Right wrist: Normal. She exhibits no bony tenderness and no crepitus.      Left wrist: Normal.      Right forearm: She exhibits no tenderness, no bony tenderness and no swelling.        Arms:    Neurological: no focal deficit. She is alert.   Skin: Skin is warm, dry, intact and no rash. Capillary refill takes less than 2 seconds. erythema No abrasion, No burn, No bruising and No ecchymosis   Psychiatric: Her speech is normal.   Nursing note and vitals reviewed.      Assessment:     1. Skin tear of right forearm without complication, initial encounter        Plan:   Discuss concern for skin tear with son and delayed healing with discharge plan continued wound care discontinuation of peroxide in favor of antibiotic ointment with backup antibiotic coverage and continued monitoring of wound.    Recommend clean skin tear wound gently with soap and water then pat dry with towel.  Then may apply thin coat of triple antibiotic ointment or Bactroban antibiotic ointment with small Band-Aid application 1-2 times daily.  Anticipate skin tear wound delayed healing 1-2 weeks.  Recommend discontinue daily peroxide use.  Recommend Keflex antibiotic coverage.  Recommend primary care wound check in 1-2 weeks.    Skin tear of right forearm without complication, initial encounter    Other orders  -     cephALEXin (KEFLEX) 500 MG capsule; Take 1 capsule (500 mg total) by mouth every 12 (twelve) hours. for 7 days  Dispense: 14 capsule; Refill: 0  -     mupirocin (BACTROBAN) 2 % ointment; Apply topically 3 (three) times daily. for 7 days  Dispense: 15 g; Refill: 0

## 2024-04-11 NOTE — PROGRESS NOTES
Subjective:       Patient ID: Karina Chun is a 89 y.o. female.    Chief Complaint:  I'm doing okay    Diagnosis: Monoclonal gammopathy    Current Treatment: Observation    Clinical History:  Patient had a wellness visit 5/23/23.  Laboratory testing showed mild anemia with a hemoglobin of 10.8 and hematocrit 33.8 with normal red cell indices.  Total protein was elevated at 8.7 with a globulin fraction of 4.7.  BUN 18 and creatinine 1.12, serum calcium 9.0.  B12 and serum ferritin levels were normal.  SPEP drawn 6/16/23 showed a monoclonal spike in the gamma region measuring 2.3 gm/dL.  No IEP or quantitative immunoglobulins done.    She was seen as a new patient 8/14/23 for a hematology evaluation.  She lives in an assisted living complex with her .  She has significant dementia and could not give a reliable history.  Family reported no recent illnesses or infections.  No appetite changes or weight loss.  No fever, chills or night sweats.  No complaints of bone pain.  An initial period of observation was recommended.  Her monoclonal protein remained stable on follow-up testing.    Interval History  She returns to clinic today for a four-month follow-up visit accompanied by her daughter.  She continues to reside with her  in an assisted living complex.  She was treated as an outpatient for COVID-19 last month.  She has fully recovered.  Otherwise, she has had no significant illnesses or injuries.  No hospitalizations.  Follow-up laboratory testing showed a stable monoclonal IgG protein without significant progression.  No progressive renal insufficiency, anemia or hypercalcemia.  She has no symptoms of bone pain.  Appetite is good, no weight loss.      Review of Systems   Constitutional:  Negative for appetite change, fatigue, fever and unexpected weight change.   HENT:  Negative for mouth sores, sore throat and trouble swallowing.    Eyes: Negative.    Respiratory:  Negative for cough and shortness of  "breath.    Cardiovascular:  Negative for chest pain, palpitations and leg swelling.   Gastrointestinal:  Negative for abdominal distention, abdominal pain, constipation, diarrhea, nausea and vomiting.   Genitourinary:  Negative for dysuria, frequency and urgency.   Musculoskeletal:  Positive for arthralgias. Negative for back pain.   Integumentary:  Negative for pallor and rash.   Neurological:  Positive for memory loss. Negative for dizziness, weakness, numbness and headaches.   Hematological:  Negative for adenopathy. Does not bruise/bleed easily.   Psychiatric/Behavioral: Negative.         PMHx:  Hypothyroidism, osteoarthritis, migraines, dementia, CRI  PSHx:  Tonsils  SH:  Lifetime nonsmoker, no significant alcohol use.  Lives in assisted living in [place] with her .  FH:  No family history of cancer.    Objective:        /82   Pulse 75   Temp 98.4 °F (36.9 °C)   Resp 15   Ht 5' 7" (1.702 m)   Wt 73.4 kg (161 lb 12.8 oz)   SpO2 96%   BMI 25.34 kg/m²    Physical Exam  Constitutional:       Comments: Elderly, well-developed white female in NAD, ambulatory without assistance   HENT:      Head: Normocephalic.      Mouth/Throat:      Mouth: Mucous membranes are moist.      Pharynx: Oropharynx is clear. No posterior oropharyngeal erythema.   Eyes:      Extraocular Movements: Extraocular movements intact.      Conjunctiva/sclera: Conjunctivae normal.      Pupils: Pupils are equal, round, and reactive to light.   Cardiovascular:      Rate and Rhythm: Normal rate and regular rhythm.      Heart sounds: No murmur heard.  Pulmonary:      Comments: Lungs clear to auscultation  Abdominal:      General: Bowel sounds are normal. There is no distension.      Palpations: Abdomen is soft.      Tenderness: There is no abdominal tenderness.      Comments: No palpable masses or organomegaly   Musculoskeletal:         General: Swelling (Trace ankle edema) present. No tenderness. Normal range of motion.      " Cervical back: Neck supple. No tenderness.   Lymphadenopathy:      Cervical: No cervical adenopathy.   Skin:     General: Skin is warm and dry.      Findings: No rash.   Neurological:      General: No focal deficit present.      Mental Status: She is alert and oriented to person, place, and time.      Cranial Nerves: No cranial nerve deficit.      Motor: No weakness.       ECOG SCORE    2 - Capable of all selfcare but unable to carry out any work activities, active > 50% of hours          LABORATORY  Recent Results (from the past 168 hour(s))   Beta-2 Microglobulin, Serum    Collection Time: 04/18/24 11:11 AM   Result Value Ref Range    Beta 2 Microglobulin 2.830 (H) 0.970 - 2.640 mg/L   Comprehensive Metabolic Panel    Collection Time: 04/18/24 11:11 AM   Result Value Ref Range    Sodium Level 138 132 - 146 mmol/L    Potassium Level 4.4 3.5 - 5.1 mmol/L    Chloride 107 98 - 111 mmol/L    Carbon Dioxide 25 23 - 31 mmol/L    Glucose Level 85 75 - 121 mg/dL    Blood Urea Nitrogen 12.9 9.8 - 20.1 mg/dL    Creatinine 1.11 (H) 0.55 - 1.02 mg/dL    Calcium Level Total 9.3 8.4 - 10.2 mg/dL    Protein Total 8.1 (H) 5.8 - 7.6 gm/dL    Albumin Level 3.6 3.4 - 4.8 g/dL    Globulin 4.5 (H) 2.4 - 3.5 gm/dL    Albumin/Globulin Ratio 0.8 (L) 1.1 - 2.0 ratio    Bilirubin Total 0.6 <=1.5 mg/dL    Alkaline Phosphatase 83 40 - 150 unit/L    Alanine Aminotransferase 15 0 - 55 unit/L    Aspartate Aminotransferase 21 5 - 34 unit/L    eGFR 47 mls/min/1.73/m2   Immunoglobulins (IgG, IgA, IgM) Quantitative    Collection Time: 04/18/24 11:11 AM   Result Value Ref Range    IgG Level 3,005.00 (H) 522.00 - 1,631.00 mg/dL    IgA Level 10.0 (L) 69.0 - 517.0 mg/dL    IgM Level 31.0 (L) 33.0 - 293.0 mg/dL   Immunoglobulin Free LT Chains Blood    Collection Time: 04/18/24 11:11 AM   Result Value Ref Range    Kappa Free Light Chain 6.00 (H) 0.3300 - 1.94 mg/dL    Lambda Free Light Chain 0.8500 0.5700 - 2.63 mg/dL    Kappa/Lambda FLC Ratio 7.06 (H)  0.2600 - 1.65   Immunofixation & Protein Electrophoresis    Collection Time: 04/18/24 11:11 AM   Result Value Ref Range    Protein Total 8.0 (H) 5.8 - 7.6 gm/dL    Albumin Level 3.4 3.4 - 4.8 g/dL    Globulin 4.6 (H) 2.4 - 3.5 gm/dL    Albumin/Globulin Ratio 0.7 (L) 1.1 - 2.0 ratio    Albumin, SPEP 42.38     Alpha 1 Glob % 3.62     Alpha 1 Glob 0.29 gm/dl    Alpha 2 Glob% 12.22     Alpha 2 Glob 0.98 gm/dl    Beta Glob % 12.45     Beta Glob 1.00 gm/dl    Gamma Globulin % 29.33     Gamma Globulin 2.35 gm/dl    M Lucas % 20.45     M Lucas 1.64 gm/dl   CBC with Differential    Collection Time: 04/18/24 11:11 AM   Result Value Ref Range    WBC 5.44 4.50 - 11.50 x10(3)/mcL    RBC 3.63 (L) 4.20 - 5.40 x10(6)/mcL    Hgb 11.1 (L) 12.0 - 16.0 g/dL    Hct 33.2 (L) 37.0 - 47.0 %    MCV 91.5 80.0 - 94.0 fL    MCH 30.6 27.0 - 31.0 pg    MCHC 33.4 33.0 - 36.0 g/dL    RDW 13.8 11.5 - 17.0 %    Platelet 241 130 - 400 x10(3)/mcL    MPV 8.7 7.4 - 10.4 fL    Neut % 50.5 %    Lymph % 39.7 %    Mono % 7.2 %    Eos % 1.8 %    Basophil % 0.6 %    Lymph # 2.16 0.6 - 4.6 x10(3)/mcL    Neut # 2.75 2.1 - 9.2 x10(3)/mcL    Mono # 0.39 0.1 - 1.3 x10(3)/mcL    Eos # 0.10 0 - 0.9 x10(3)/mcL    Baso # 0.03 <=0.2 x10(3)/mcL    IG# 0.01 0 - 0.04 x10(3)/mcL    IG% 0.2 %   Pathologist Interpretation    Collection Time: 04/18/24 11:11 AM   Result Value Ref Range    Pathology Review       SPEP: A monoclonal protein (M-spike) is present in the gamma region (1.644 g/dL).     DAE:  Immunofixation shows an IgG monoclonal protein with kappa light chain specificity.      João Leung M.D.                          8/14/23 11/27/23 4/18/24  IgG          3438        2908         3005  IgA              10              8             10  IgM              26            29            31  MS            1.84         1.60         1.64  B2M          2.78         2.45         2.83  FKLC        5.76         4.85         6.00  FLLC         0.79         0.60          0.85  Ratio         7.29         8.08         7.06      Assessment:   Monoclonal gammopathy of unknown significance  Anemia of chronic renal disease  Chronic renal insufficiency      Plan:   She remains asymptomatic with no significant change in her monoclonal IgG level since 08/23.  Continued observation is recommended.  Her daughter is in agreement.    RTC in 4 months for a follow-up visit with repeat laboratory.  If testing is stable at that time, will increase her surveillance visits to every 6 months.      LARS DIOR MD    Other Physicians  Dr. Juli Logan

## 2024-04-18 ENCOUNTER — LAB VISIT (OUTPATIENT)
Dept: LAB | Facility: HOSPITAL | Age: 89
End: 2024-04-18
Attending: INTERNAL MEDICINE
Payer: MEDICARE

## 2024-04-18 DIAGNOSIS — D47.2 MONOCLONAL GAMMOPATHY: ICD-10-CM

## 2024-04-18 LAB
ALBUMIN SERPL-MCNC: 3.6 G/DL (ref 3.4–4.8)
ALBUMIN/GLOB SERPL: 0.8 RATIO (ref 1.1–2)
ALP SERPL-CCNC: 83 UNIT/L (ref 40–150)
ALT SERPL-CCNC: 15 UNIT/L (ref 0–55)
AST SERPL-CCNC: 21 UNIT/L (ref 5–34)
B2 MICROGLOB SERPL-MCNC: 2.83 MG/L (ref 0.97–2.64)
BASOPHILS # BLD AUTO: 0.03 X10(3)/MCL
BASOPHILS NFR BLD AUTO: 0.6 %
BILIRUB SERPL-MCNC: 0.6 MG/DL
BUN SERPL-MCNC: 12.9 MG/DL (ref 9.8–20.1)
CALCIUM SERPL-MCNC: 9.3 MG/DL (ref 8.4–10.2)
CHLORIDE SERPL-SCNC: 107 MMOL/L (ref 98–111)
CO2 SERPL-SCNC: 25 MMOL/L (ref 23–31)
CREAT SERPL-MCNC: 1.11 MG/DL (ref 0.55–1.02)
EOSINOPHIL # BLD AUTO: 0.1 X10(3)/MCL (ref 0–0.9)
EOSINOPHIL NFR BLD AUTO: 1.8 %
ERYTHROCYTE [DISTWIDTH] IN BLOOD BY AUTOMATED COUNT: 13.8 % (ref 11.5–17)
GFR SERPLBLD CREATININE-BSD FMLA CKD-EPI: 47 MLS/MIN/1.73/M2
GLOBULIN SER-MCNC: 4.5 GM/DL (ref 2.4–3.5)
GLUCOSE SERPL-MCNC: 85 MG/DL (ref 75–121)
HCT VFR BLD AUTO: 33.2 % (ref 37–47)
HGB BLD-MCNC: 11.1 G/DL (ref 12–16)
IGA SERPL-MCNC: 10 MG/DL (ref 69–517)
IGG SERPL-MCNC: 3005 MG/DL (ref 522–1631)
IGM SERPL-MCNC: 31 MG/DL (ref 33–293)
IMM GRANULOCYTES # BLD AUTO: 0.01 X10(3)/MCL (ref 0–0.04)
IMM GRANULOCYTES NFR BLD AUTO: 0.2 %
LYMPHOCYTES # BLD AUTO: 2.16 X10(3)/MCL (ref 0.6–4.6)
LYMPHOCYTES NFR BLD AUTO: 39.7 %
MCH RBC QN AUTO: 30.6 PG (ref 27–31)
MCHC RBC AUTO-ENTMCNC: 33.4 G/DL (ref 33–36)
MCV RBC AUTO: 91.5 FL (ref 80–94)
MONOCYTES # BLD AUTO: 0.39 X10(3)/MCL (ref 0.1–1.3)
MONOCYTES NFR BLD AUTO: 7.2 %
NEUTROPHILS # BLD AUTO: 2.75 X10(3)/MCL (ref 2.1–9.2)
NEUTROPHILS NFR BLD AUTO: 50.5 %
PLATELET # BLD AUTO: 241 X10(3)/MCL (ref 130–400)
PMV BLD AUTO: 8.7 FL (ref 7.4–10.4)
POTASSIUM SERPL-SCNC: 4.4 MMOL/L (ref 3.5–5.1)
PROT SERPL-MCNC: 8.1 GM/DL (ref 5.8–7.6)
RBC # BLD AUTO: 3.63 X10(6)/MCL (ref 4.2–5.4)
SODIUM SERPL-SCNC: 138 MMOL/L (ref 132–146)
WBC # SPEC AUTO: 5.44 X10(3)/MCL (ref 4.5–11.5)

## 2024-04-18 PROCEDURE — 85025 COMPLETE CBC W/AUTO DIFF WBC: CPT

## 2024-04-18 PROCEDURE — 86146 BETA-2 GLYCOPROTEIN ANTIBODY: CPT

## 2024-04-18 PROCEDURE — 84165 PROTEIN E-PHORESIS SERUM: CPT

## 2024-04-18 PROCEDURE — 82784 ASSAY IGA/IGD/IGG/IGM EACH: CPT

## 2024-04-18 PROCEDURE — 36415 COLL VENOUS BLD VENIPUNCTURE: CPT

## 2024-04-18 PROCEDURE — 83521 IG LIGHT CHAINS FREE EACH: CPT | Mod: 59

## 2024-04-18 PROCEDURE — 80053 COMPREHEN METABOLIC PANEL: CPT

## 2024-04-19 LAB
ALBUMIN % SPEP (OHS): 42.38
ALBUMIN SERPL-MCNC: 3.4 G/DL (ref 3.4–4.8)
ALBUMIN/GLOB SERPL: 0.7 RATIO (ref 1.1–2)
ALPHA 1 GLOB (OHS): 0.29 GM/DL
ALPHA 1 GLOB% (OHS): 3.62
ALPHA 2 GLOB % (OHS): 12.22
ALPHA 2 GLOB (OHS): 0.98 GM/DL
BETA GLOB (OHS): 1 GM/DL
BETA GLOB% (OHS): 12.45
GAMMA GLOBULIN % (OHS): 29.33
GAMMA GLOBULIN (OHS): 2.35 GM/DL
GLOBULIN SER-MCNC: 4.6 GM/DL (ref 2.4–3.5)
KAPPA LC FREE SER-MCNC: 6 MG/DL (ref 0.33–1.94)
KAPPA LC FREE/LAMBDA FREE SER: 7.06 {RATIO} (ref 0.26–1.65)
LAMBDA LC FREE SERPL-MCNC: 0.85 MG/DL (ref 0.57–2.63)
M SPIKE % (OHS): 20.45
M SPIKE (OHS): 1.64 GM/DL
PATH REV: NORMAL
PROT SERPL-MCNC: 8 GM/DL (ref 5.8–7.6)

## 2024-04-22 ENCOUNTER — OFFICE VISIT (OUTPATIENT)
Dept: HEMATOLOGY/ONCOLOGY | Facility: CLINIC | Age: 89
End: 2024-04-22
Payer: MEDICARE

## 2024-04-22 VITALS
HEART RATE: 75 BPM | BODY MASS INDEX: 25.4 KG/M2 | RESPIRATION RATE: 15 BRPM | SYSTOLIC BLOOD PRESSURE: 130 MMHG | OXYGEN SATURATION: 96 % | WEIGHT: 161.81 LBS | DIASTOLIC BLOOD PRESSURE: 82 MMHG | HEIGHT: 67 IN | TEMPERATURE: 98 F

## 2024-04-22 DIAGNOSIS — D47.2 MONOCLONAL GAMMOPATHY: Primary | ICD-10-CM

## 2024-04-22 PROCEDURE — 99999 PR PBB SHADOW E&M-EST. PATIENT-LVL IV: CPT | Mod: PBBFAC,,, | Performed by: INTERNAL MEDICINE

## 2024-04-22 PROCEDURE — 99214 OFFICE O/P EST MOD 30 MIN: CPT | Mod: S$PBB,,, | Performed by: INTERNAL MEDICINE

## 2024-04-22 PROCEDURE — 99214 OFFICE O/P EST MOD 30 MIN: CPT | Mod: PBBFAC | Performed by: INTERNAL MEDICINE

## 2024-07-27 ENCOUNTER — OFFICE VISIT (OUTPATIENT)
Dept: URGENT CARE | Facility: CLINIC | Age: 89
End: 2024-07-27
Payer: MEDICARE

## 2024-07-27 VITALS
HEART RATE: 89 BPM | TEMPERATURE: 98 F | OXYGEN SATURATION: 97 % | HEIGHT: 67 IN | DIASTOLIC BLOOD PRESSURE: 76 MMHG | WEIGHT: 155 LBS | BODY MASS INDEX: 24.33 KG/M2 | RESPIRATION RATE: 20 BRPM | SYSTOLIC BLOOD PRESSURE: 131 MMHG

## 2024-07-27 DIAGNOSIS — R30.0 DYSURIA: Primary | ICD-10-CM

## 2024-07-27 DIAGNOSIS — R33.9 URINARY RETENTION: ICD-10-CM

## 2024-07-27 PROCEDURE — 99213 OFFICE O/P EST LOW 20 MIN: CPT | Mod: ,,, | Performed by: PHYSICIAN ASSISTANT

## 2024-07-27 RX ORDER — RISPERIDONE 0.5 MG/1
TABLET ORAL
COMMUNITY
Start: 2024-06-25

## 2024-07-27 NOTE — PROGRESS NOTES
"Subjective:      Patient ID: Karina Chun is a 89 y.o. female.    Vitals:  height is 5' 7" (1.702 m) and weight is 70.3 kg (155 lb). Her tympanic temperature is 97.8 °F (36.6 °C). Her blood pressure is 131/76 and her pulse is 89. Her respiration is 20 and oxygen saturation is 97%.     Chief Complaint: Urinary Retention    Elderly female with history of dementia on medications Remeron Namenda risperidone and trazodone living in assisted living noted by staff to have initially urinary incontinence with dementia medication placed in adult diaper now in the last 3-4 days having straining unable to void with unsoiled diapers noted by staff contacting family transported by daughter to Urgent Care for evaluation.  Patient unable to remember last time voiding.        Constitution: Negative for fever.   Genitourinary:  Positive for urine decreased. Negative for dysuria, frequency, urgency, hematuria and pelvic pain.   Musculoskeletal:  Negative for back pain.      Objective:     Physical Exam   Constitutional: She appears well-developed. She is cooperative. No distress.      Comments:Awake alert ambulatory elderly female attended by daughter     HENT:   Nose: Nose normal.   Mouth/Throat: Oropharynx is clear and moist and mucous membranes are normal.   Eyes: Lids are normal.   Neck: Trachea normal and phonation normal. Neck supple.   Cardiovascular: Normal rate and normal pulses.   Pulmonary/Chest: Effort normal.   Abdominal: Normal appearance. She exhibits no distension. Soft. flat abdomen There is no abdominal tenderness. There is no guarding, no left CVA tenderness and no right CVA tenderness.   Musculoskeletal: Normal range of motion.         General: Normal range of motion.   Neurological: She is alert and at baseline. She has normal strength and normal reflexes.   Skin: Skin is warm, dry, intact and not diaphoretic.   Psychiatric: Her speech is normal.   Nursing note and vitals reviewed.       Previous History  " "    Review of patient's allergies indicates:   Allergen Reactions    Codeine phosphate (bulk)     Moxifloxacin        Past Medical History:   Diagnosis Date    Migraines     Thyroid disease      Current Outpatient Medications   Medication Instructions    aspirin 81 mg, Oral    cholecalciferol (vitamin D3) (VITAMIN D3) 5,000 Units, Oral, Daily    cyanocobalamin (VITAMIN B-12) 2,000 mcg, Oral, Daily    cyclobenzaprine (FLEXERIL) 10 mg, Oral, 2 times daily PRN    diclofenac (VOLTAREN) 75 mg, Oral, 2 times daily    fluticasone propionate (FLONASE) 50 mcg/actuation nasal spray 2 sprays, Each Nostril    krill-om-3-dha-epa-phospho-ast (KRILL OIL) 695-971-55-75 mg Cap Oral    levocetirizine (XYZAL) 5 mg, Oral, Nightly    magnesium 30 mg Tab Oral, Once    mirtazapine (REMERON) 15 mg, Oral, Nightly    pantoprazole (PROTONIX) 40 mg, Oral, 2 times daily    RESVERATROL ORAL Oral    risperiDONE (RISPERDAL) 0.5 MG Tab Oral    sucralfate (CARAFATE) 1 g, Oral    sumatriptan (IMITREX) 50 mg, Oral, Every 2 hours PRN    traMADoL (ULTRAM) 50 mg, Oral, Daily PRN    traZODone (DESYREL) 50 MG tablet Oral    tumeric-ging-olive-oreg-capryl 100 mg-150 mg- 50 mg-150 mg Cap Oral    UNABLE TO FIND medication name: CBD pill    UNABLE TO FIND medication name: lions olamide mushroom     Past Surgical History:   Procedure Laterality Date    TONSILLECTOMY       Family History   Problem Relation Name Age of Onset    Hypertension Mother      Hypertension Father      No Known Problems Sister      No Known Problems Brother      Cancer Neg Hx         Social History     Tobacco Use    Smoking status: Never    Smokeless tobacco: Never   Substance Use Topics    Alcohol use: Never    Drug use: Never        Physical Exam      Vital Signs Reviewed   /76 (BP Location: Left arm)   Pulse 89   Temp 97.8 °F (36.6 °C) (Tympanic)   Resp 20   Ht 5' 7" (1.702 m)   Wt 70.3 kg (155 lb)   SpO2 97%   BMI 24.28 kg/m²        Procedures    Procedures     Labs "     Results for orders placed or performed in visit on 04/18/24   Beta-2 Microglobulin, Serum   Result Value Ref Range    Beta 2 Microglobulin 2.830 (H) 0.970 - 2.640 mg/L   Comprehensive Metabolic Panel   Result Value Ref Range    Sodium 138 132 - 146 mmol/L    Potassium 4.4 3.5 - 5.1 mmol/L    Chloride 107 98 - 111 mmol/L    CO2 25 23 - 31 mmol/L    Glucose 85 75 - 121 mg/dL    Blood Urea Nitrogen 12.9 9.8 - 20.1 mg/dL    Creatinine 1.11 (H) 0.55 - 1.02 mg/dL    Calcium 9.3 8.4 - 10.2 mg/dL    Protein Total 8.1 (H) 5.8 - 7.6 gm/dL    Albumin 3.6 3.4 - 4.8 g/dL    Globulin 4.5 (H) 2.4 - 3.5 gm/dL    Albumin/Globulin Ratio 0.8 (L) 1.1 - 2.0 ratio    Bilirubin Total 0.6 <=1.5 mg/dL    ALP 83 40 - 150 unit/L    ALT 15 0 - 55 unit/L    AST 21 5 - 34 unit/L    eGFR 47 mls/min/1.73/m2   Immunoglobulins (IgG, IgA, IgM) Quantitative   Result Value Ref Range    IgG Level 3,005.00 (H) 522.00 - 1,631.00 mg/dL    IgA Level 10.0 (L) 69.0 - 517.0 mg/dL    IgM Level 31.0 (L) 33.0 - 293.0 mg/dL   Immunoglobulin Free LT Chains Blood   Result Value Ref Range    Kappa Free Light Chain 6.00 (H) 0.3300 - 1.94 mg/dL    Lambda Free Light Chain 0.8500 0.5700 - 2.63 mg/dL    Kappa/Lambda FLC Ratio 7.06 (H) 0.2600 - 1.65   Immunofixation & Protein Electrophoresis   Result Value Ref Range    Protein Total 8.0 (H) 5.8 - 7.6 gm/dL    Albumin 3.4 3.4 - 4.8 g/dL    Globulin 4.6 (H) 2.4 - 3.5 gm/dL    Albumin/Globulin Ratio 0.7 (L) 1.1 - 2.0 ratio    Albumin, SPEP 42.38     Alpha 1 Glob % 3.62     Alpha 1 Glob 0.29 gm/dl    Alpha 2 Glob% 12.22     Alpha 2 Glob 0.98 gm/dl    Beta Glob % 12.45     Beta Glob 1.00 gm/dl    Gamma Globulin % 29.33     Gamma Globulin 2.35 gm/dl    M Lucas % 20.45     M Lucas 1.64 gm/dl   CBC with Differential   Result Value Ref Range    WBC 5.44 4.50 - 11.50 x10(3)/mcL    RBC 3.63 (L) 4.20 - 5.40 x10(6)/mcL    Hgb 11.1 (L) 12.0 - 16.0 g/dL    Hct 33.2 (L) 37.0 - 47.0 %    MCV 91.5 80.0 - 94.0 fL    MCH 30.6 27.0 - 31.0  pg    MCHC 33.4 33.0 - 36.0 g/dL    RDW 13.8 11.5 - 17.0 %    Platelet 241 130 - 400 x10(3)/mcL    MPV 8.7 7.4 - 10.4 fL    Neut % 50.5 %    Lymph % 39.7 %    Mono % 7.2 %    Eos % 1.8 %    Basophil % 0.6 %    Lymph # 2.16 0.6 - 4.6 x10(3)/mcL    Neut # 2.75 2.1 - 9.2 x10(3)/mcL    Mono # 0.39 0.1 - 1.3 x10(3)/mcL    Eos # 0.10 0 - 0.9 x10(3)/mcL    Baso # 0.03 <=0.2 x10(3)/mcL    IG# 0.01 0 - 0.04 x10(3)/mcL    IG% 0.2 %   Pathologist Interpretation   Result Value Ref Range    Pathology Review       SPEP: A monoclonal protein (M-spike) is present in the gamma region (1.644 g/dL).     DAE:  Immunofixation shows an IgG monoclonal protein with kappa light chain specificity.      João Leung M.D.             Assessment:     1. Dysuria    2. Urinary retention        Plan:   Elderly female ambulatory in clinic with multiple attempts unable to provide urine sample in clinic.   Pelvis and bladder nontender to exam.  Discussed with daughter high concern for urinary straining and possible medication history side-effect with limited urgent care capability no ability for bladder scan or urinary catheterization encouraged emergency department evaluation today for further evaluation potential catheterization urine collection and continued care planning.  Daughter voices concern for possible medication unwanted side-effects. Daughter verbalized understanding satisfied we will travel to local emergency department [place] with patient for further evaluation.    Concern for possible urinary retention.  Recommend go to emergency department for further evaluation possible bladder scan and urinary catheterization.    Dysuria  -     POCT Urinalysis, Dipstick, Manual, W/O Scope  -     Urine Culture High Risk    Urinary retention

## 2024-09-19 ENCOUNTER — LAB VISIT (OUTPATIENT)
Dept: LAB | Facility: HOSPITAL | Age: 89
End: 2024-09-19
Attending: INTERNAL MEDICINE
Payer: MEDICARE

## 2024-09-19 DIAGNOSIS — D47.2 MONOCLONAL GAMMOPATHY: ICD-10-CM

## 2024-09-19 LAB
ALBUMIN SERPL-MCNC: 3.5 G/DL (ref 3.4–4.8)
ALBUMIN/GLOB SERPL: 0.6 RATIO (ref 1.1–2)
ALP SERPL-CCNC: 63 UNIT/L (ref 40–150)
ALT SERPL-CCNC: 10 UNIT/L (ref 0–55)
ANION GAP SERPL CALC-SCNC: 4 MEQ/L
AST SERPL-CCNC: 17 UNIT/L (ref 5–34)
B2 MICROGLOB SERPL-MCNC: 2.92 MG/L (ref 0.97–2.64)
BASOPHILS # BLD AUTO: 0.02 X10(3)/MCL
BASOPHILS NFR BLD AUTO: 0.4 %
BILIRUB SERPL-MCNC: 0.6 MG/DL
BUN SERPL-MCNC: 25.2 MG/DL (ref 9.8–20.1)
CALCIUM SERPL-MCNC: 9.1 MG/DL (ref 8.4–10.2)
CHLORIDE SERPL-SCNC: 110 MMOL/L (ref 98–111)
CO2 SERPL-SCNC: 25 MMOL/L (ref 23–31)
CREAT SERPL-MCNC: 0.86 MG/DL (ref 0.55–1.02)
CREAT/UREA NIT SERPL: 29
EOSINOPHIL # BLD AUTO: 0.08 X10(3)/MCL (ref 0–0.9)
EOSINOPHIL NFR BLD AUTO: 1.6 %
ERYTHROCYTE [DISTWIDTH] IN BLOOD BY AUTOMATED COUNT: 14.1 % (ref 11.5–17)
GFR SERPLBLD CREATININE-BSD FMLA CKD-EPI: >60 ML/MIN/1.73/M2
GLOBULIN SER-MCNC: 5.6 GM/DL (ref 2.4–3.5)
GLUCOSE SERPL-MCNC: 90 MG/DL (ref 75–121)
HCT VFR BLD AUTO: 33.1 % (ref 37–47)
HGB BLD-MCNC: 10.9 G/DL (ref 12–16)
IGA SERPL-MCNC: 6 MG/DL (ref 69–517)
IGG SERPL-MCNC: 4471 MG/DL (ref 522–1631)
IGM SERPL-MCNC: 21 MG/DL (ref 33–293)
IMM GRANULOCYTES # BLD AUTO: 0.01 X10(3)/MCL (ref 0–0.04)
IMM GRANULOCYTES NFR BLD AUTO: 0.2 %
LYMPHOCYTES # BLD AUTO: 2.26 X10(3)/MCL (ref 0.6–4.6)
LYMPHOCYTES NFR BLD AUTO: 46.3 %
MCH RBC QN AUTO: 31.6 PG (ref 27–31)
MCHC RBC AUTO-ENTMCNC: 32.9 G/DL (ref 33–36)
MCV RBC AUTO: 95.9 FL (ref 80–94)
MONOCYTES # BLD AUTO: 0.35 X10(3)/MCL (ref 0.1–1.3)
MONOCYTES NFR BLD AUTO: 7.2 %
NEUTROPHILS # BLD AUTO: 2.16 X10(3)/MCL (ref 2.1–9.2)
NEUTROPHILS NFR BLD AUTO: 44.3 %
PLATELET # BLD AUTO: 230 X10(3)/MCL (ref 130–400)
PMV BLD AUTO: 8.7 FL (ref 7.4–10.4)
POTASSIUM SERPL-SCNC: 4.1 MMOL/L (ref 3.5–5.1)
PROT SERPL-MCNC: 9.1 GM/DL (ref 5.8–7.6)
RBC # BLD AUTO: 3.45 X10(6)/MCL (ref 4.2–5.4)
SODIUM SERPL-SCNC: 139 MMOL/L (ref 136–145)
WBC # BLD AUTO: 4.88 X10(3)/MCL (ref 4.5–11.5)

## 2024-09-19 PROCEDURE — 83521 IG LIGHT CHAINS FREE EACH: CPT

## 2024-09-19 PROCEDURE — 82784 ASSAY IGA/IGD/IGG/IGM EACH: CPT

## 2024-09-19 PROCEDURE — 86334 IMMUNOFIX E-PHORESIS SERUM: CPT

## 2024-09-19 PROCEDURE — 84165 PROTEIN E-PHORESIS SERUM: CPT

## 2024-09-19 PROCEDURE — 86146 BETA-2 GLYCOPROTEIN ANTIBODY: CPT

## 2024-09-19 PROCEDURE — 36415 COLL VENOUS BLD VENIPUNCTURE: CPT

## 2024-09-19 PROCEDURE — 80053 COMPREHEN METABOLIC PANEL: CPT

## 2024-09-19 PROCEDURE — 85025 COMPLETE CBC W/AUTO DIFF WBC: CPT

## 2024-09-20 LAB
ALBUMIN % SPEP (OHS): 41.56 (ref 48.1–59.5)
ALBUMIN SERPL-MCNC: 3.9 G/DL (ref 3.4–4.8)
ALBUMIN/GLOB SERPL: 0.7 RATIO (ref 1.1–2)
ALPHA 1 GLOB (OHS): 0.3 GM/DL (ref 0–0.4)
ALPHA 1 GLOB% (OHS): 3.21 (ref 2.3–4.9)
ALPHA 2 GLOB % (OHS): 10.15 (ref 6.9–13)
ALPHA 2 GLOB (OHS): 0.94 GM/DL (ref 0.4–1)
BETA GLOB (OHS): 1.06 GM/DL (ref 0.7–1.3)
BETA GLOB% (OHS): 11.4 (ref 13.8–19.7)
GAMMA GLOBULIN % (OHS): 33.68 (ref 10.1–21.9)
GAMMA GLOBULIN (OHS): 3.13 GM/DL (ref 0.4–1.8)
GLOBULIN SER-MCNC: 5.4 GM/DL (ref 2.4–3.5)
KAPPA LC FREE SER NEPH-MCNC: 11.2 MG/DL (ref 0.33–1.94)
KAPPA LC FREE/LAMBDA FREE SER NEPH: 30.3 {RATIO} (ref 0.26–1.65)
LAMBDA LC FREE SERPL-MCNC: 0.37 MG/DL (ref 0.57–2.63)
M SPIKE % (OHS): 26.12
M SPIKE (OHS): 2.43 GM/DL
PATH REV: NORMAL
PROT SERPL-MCNC: 9.3 GM/DL (ref 5.8–7.6)

## 2024-09-25 PROBLEM — D47.2 MONOCLONAL GAMMOPATHY: Status: ACTIVE | Noted: 2024-09-25

## 2024-09-25 NOTE — PROGRESS NOTES
Subjective:       Patient ID: Karina Chun is a 89 y.o. female.    Chief Complaint:    Diagnosis: Monoclonal gammopathy    Current Treatment: Observation    Clinical History:  Patient had a wellness visit 5/23/23.  Laboratory testing showed mild anemia with a hemoglobin of 10.8 and hematocrit 33.8 with normal red cell indices.  Total protein was elevated at 8.7 with a globulin fraction of 4.7.  BUN 18 and creatinine 1.12, serum calcium 9.0.  B12 and serum ferritin levels were normal.  SPEP drawn 6/16/23 showed a monoclonal spike in the gamma region measuring 2.3 gm/dL.  No IEP or quantitative immunoglobulins done.    She was seen as a new patient 8/14/23 for a hematology evaluation.  She lives in an assisted living complex with her .  She has significant dementia and could not give a reliable history.  Family reported no recent illnesses or infections.  No appetite changes or weight loss.  No fever, chills or night sweats.  No complaints of bone pain.  An initial period of observation was recommended.  Her monoclonal protein remained stable on follow-up testing.    Interval History  Follow-up laboratory testing showed a stable monoclonal IgG protein without significant progression.  No progressive renal insufficiency, anemia or hypercalcemia.  She has no symptoms of bone pain.  Appetite is good, no weight loss.      Review of Systems   Constitutional:  Negative for appetite change, fatigue, fever and unexpected weight change.   HENT:  Negative for mouth sores, sore throat and trouble swallowing.    Eyes: Negative.    Respiratory:  Negative for cough and shortness of breath.    Cardiovascular:  Negative for chest pain, palpitations and leg swelling.   Gastrointestinal:  Negative for abdominal distention, abdominal pain, constipation, diarrhea, nausea and vomiting.   Genitourinary:  Negative for dysuria, frequency and urgency.   Musculoskeletal:  Positive for arthralgias. Negative for back pain.    Integumentary:  Negative for pallor and rash.   Neurological:  Positive for memory loss. Negative for dizziness, weakness, numbness and headaches.   Hematological:  Negative for adenopathy. Does not bruise/bleed easily.   Psychiatric/Behavioral: Negative.         PMHx:  Hypothyroidism, osteoarthritis, migraines, dementia, CRI  PSHx:  Tonsils  SH:  Lifetime nonsmoker, no significant alcohol use.  Lives in assisted living in [place] with her .  FH:  No family history of cancer.    Objective:        There were no vitals taken for this visit.   Physical Exam  Constitutional:       Comments: Elderly, well-developed white female in NAD, ambulatory without assistance   HENT:      Head: Normocephalic.      Mouth/Throat:      Mouth: Mucous membranes are moist.      Pharynx: Oropharynx is clear. No posterior oropharyngeal erythema.   Eyes:      Extraocular Movements: Extraocular movements intact.      Conjunctiva/sclera: Conjunctivae normal.      Pupils: Pupils are equal, round, and reactive to light.   Cardiovascular:      Rate and Rhythm: Normal rate and regular rhythm.      Heart sounds: No murmur heard.  Pulmonary:      Comments: Lungs clear to auscultation  Abdominal:      General: Bowel sounds are normal. There is no distension.      Palpations: Abdomen is soft.      Tenderness: There is no abdominal tenderness.      Comments: No palpable masses or organomegaly   Musculoskeletal:         General: Swelling (Trace ankle edema) present. No tenderness. Normal range of motion.      Cervical back: Neck supple. No tenderness.   Lymphadenopathy:      Cervical: No cervical adenopathy.   Skin:     General: Skin is warm and dry.      Findings: No rash.   Neurological:      General: No focal deficit present.      Mental Status: She is alert and oriented to person, place, and time.      Cranial Nerves: No cranial nerve deficit.      Motor: No weakness.       ECOG SCORE              LABORATORY  Recent Results (from the past  168 hour(s))   Immunoglobulins (IgG, IgA, IgM) Quantitative    Collection Time: 09/19/24 11:32 AM   Result Value Ref Range    IgG Level 4,471.00 (H) 522.00 - 1,631.00 mg/dL    IgA Level 6.0 (L) 69.0 - 517.0 mg/dL    IgM Level 21.0 (L) 33.0 - 293.0 mg/dL   Beta-2 Microglobulin, Serum    Collection Time: 09/19/24 11:32 AM   Result Value Ref Range    Beta 2 Microglobulin 2.920 (H) 0.970 - 2.640 mg/L   Immunoglobulin Free LT Chains Blood    Collection Time: 09/19/24 11:32 AM   Result Value Ref Range    Kappa Free Light Chain 11.2 (H) 0.3300 - 1.94 mg/dL    Lambda Free Light Chain 0.3700 (L) 0.5700 - 2.63 mg/dL    Kappa/Lambda FLC Ratio 30.3 (H) 0.2600 - 1.65   Comprehensive Metabolic Panel    Collection Time: 09/19/24 11:32 AM   Result Value Ref Range    Sodium 139 136 - 145 mmol/L    Potassium 4.1 3.5 - 5.1 mmol/L    Chloride 110 98 - 111 mmol/L    CO2 25 23 - 31 mmol/L    Glucose 90 75 - 121 mg/dL    Blood Urea Nitrogen 25.2 (H) 9.8 - 20.1 mg/dL    Creatinine 0.86 0.55 - 1.02 mg/dL    Calcium 9.1 8.4 - 10.2 mg/dL    Protein Total 9.1 (H) 5.8 - 7.6 gm/dL    Albumin 3.5 3.4 - 4.8 g/dL    Globulin 5.6 (H) 2.4 - 3.5 gm/dL    Albumin/Globulin Ratio 0.6 (L) 1.1 - 2.0 ratio    Bilirubin Total 0.6 <=1.5 mg/dL    ALP 63 40 - 150 unit/L    ALT 10 0 - 55 unit/L    AST 17 5 - 34 unit/L    eGFR >60 mL/min/1.73/m2    Anion Gap 4.0 mEq/L    BUN/Creatinine Ratio 29    Immunofixation & Protein Electrophoresis    Collection Time: 09/19/24 11:32 AM   Result Value Ref Range    Protein Total 9.3 (H) 5.8 - 7.6 gm/dL    Albumin 3.9 3.4 - 4.8 g/dL    Globulin 5.4 (H) 2.4 - 3.5 gm/dL    Albumin/Globulin Ratio 0.7 (L) 1.1 - 2.0 ratio    Albumin, SPEP 41.56 (L) 48.1 - 59.5    Alpha 1 Glob % 3.21 2.3 - 4.9    Alpha 1 Glob 0.30 0.00 - 0.40 gm/dl    Alpha 2 Glob% 10.15 6.9 - 13    Alpha 2 Glob 0.94 0.40 - 1.00 gm/dl    Beta Glob % 11.40 (L) 13.8 - 19.7    Beta Glob 1.06 0.70 - 1.30 gm/dl    Gamma Globulin % 33.68 (H) 10.1 - 21.9    Gamma Globulin  3.13 (H) 0.40 - 1.80 gm/dl    M Lucas % 26.12     M Lucas 2.43 gm/dl   CBC with Differential    Collection Time: 09/19/24 11:32 AM   Result Value Ref Range    WBC 4.88 4.50 - 11.50 x10(3)/mcL    RBC 3.45 (L) 4.20 - 5.40 x10(6)/mcL    Hgb 10.9 (L) 12.0 - 16.0 g/dL    Hct 33.1 (L) 37.0 - 47.0 %    MCV 95.9 (H) 80.0 - 94.0 fL    MCH 31.6 (H) 27.0 - 31.0 pg    MCHC 32.9 (L) 33.0 - 36.0 g/dL    RDW 14.1 11.5 - 17.0 %    Platelet 230 130 - 400 x10(3)/mcL    MPV 8.7 7.4 - 10.4 fL    Neut % 44.3 %    Lymph % 46.3 %    Mono % 7.2 %    Eos % 1.6 %    Basophil % 0.4 %    Lymph # 2.26 0.6 - 4.6 x10(3)/mcL    Neut # 2.16 2.1 - 9.2 x10(3)/mcL    Mono # 0.35 0.1 - 1.3 x10(3)/mcL    Eos # 0.08 0 - 0.9 x10(3)/mcL    Baso # 0.02 <=0.2 x10(3)/mcL    IG# 0.01 0 - 0.04 x10(3)/mcL    IG% 0.2 %   Pathologist Interpretation    Collection Time: 09/19/24 11:32 AM   Result Value Ref Range    Pathology Review       The SPE shows a prominent M-spike in the gamma globulin region (2.43 g/dL).    The DAE demonstrates an IgG kappa monoclonal band.    Wilfredo Ogden M.D., Ph.D.                         8/14/23 11/27/23 4/18/24 9/19/24  IgG          3438        2908         3005       4471  IgA              10              8             10          6  IgM              26            29            31          21  MS            1.84         1.60         1.64        2.43  B2M          2.78         2.45         2.83        2.92  FKLC        5.76         4.85         6.00        11.2  FLLC         0.79         0.60         0.85        0.37  Ratio         7.29         8.08         7.06        30.3      Assessment:   Monoclonal gammopathy of unknown significance  Anemia of chronic renal disease  Chronic renal insufficiency      Plan:     If testing is stable at that time, will increase her surveillance visits to every 6 months.          Other Physicians  Dr. Juli Logan

## 2024-09-26 ENCOUNTER — OFFICE VISIT (OUTPATIENT)
Dept: HEMATOLOGY/ONCOLOGY | Facility: CLINIC | Age: 89
End: 2024-09-26
Payer: MEDICARE

## 2024-09-26 VITALS
HEART RATE: 87 BPM | WEIGHT: 141.31 LBS | BODY MASS INDEX: 24.13 KG/M2 | RESPIRATION RATE: 15 BRPM | SYSTOLIC BLOOD PRESSURE: 124 MMHG | TEMPERATURE: 98 F | HEIGHT: 64 IN | DIASTOLIC BLOOD PRESSURE: 69 MMHG | OXYGEN SATURATION: 97 %

## 2024-09-26 DIAGNOSIS — D47.2 MONOCLONAL GAMMOPATHY: Primary | ICD-10-CM

## 2024-09-26 PROCEDURE — 1101F PT FALLS ASSESS-DOCD LE1/YR: CPT | Mod: CPTII,S$GLB,, | Performed by: NURSE PRACTITIONER

## 2024-09-26 PROCEDURE — 1160F RVW MEDS BY RX/DR IN RCRD: CPT | Mod: CPTII,S$GLB,, | Performed by: NURSE PRACTITIONER

## 2024-09-26 PROCEDURE — 1126F AMNT PAIN NOTED NONE PRSNT: CPT | Mod: CPTII,S$GLB,, | Performed by: NURSE PRACTITIONER

## 2024-09-26 PROCEDURE — 99214 OFFICE O/P EST MOD 30 MIN: CPT | Mod: S$GLB,,, | Performed by: NURSE PRACTITIONER

## 2024-09-26 PROCEDURE — 3288F FALL RISK ASSESSMENT DOCD: CPT | Mod: CPTII,S$GLB,, | Performed by: NURSE PRACTITIONER

## 2024-09-26 PROCEDURE — 99999 PR PBB SHADOW E&M-EST. PATIENT-LVL V: CPT | Mod: PBBFAC,,, | Performed by: NURSE PRACTITIONER

## 2024-09-26 PROCEDURE — 1159F MED LIST DOCD IN RCRD: CPT | Mod: CPTII,S$GLB,, | Performed by: NURSE PRACTITIONER

## 2024-09-26 NOTE — PATIENT INSTRUCTIONS
IgG protein level has increased since the last visit, but does not appear to be causing any problems with her bone marrow/blood count, kidney function or calcium level.  This increase should not be affecting her cognitive/mental status at this time.  Will repeat in 3-4 months and determine what to do next.

## 2024-09-26 NOTE — PROGRESS NOTES
"Subjective:       Patient ID: Karina Chun is a 89 y.o. female.    Chief Complaint: "No complaints"    Diagnosis: Monoclonal gammopathy    Current Treatment: Observation    Clinical History:  Patient had a wellness visit 5/23/23.  Laboratory testing showed mild anemia with a hemoglobin of 10.8 and hematocrit 33.8 with normal red cell indices.  Total protein was elevated at 8.7 with a globulin fraction of 4.7.  BUN 18 and creatinine 1.12, serum calcium 9.0.  B12 and serum ferritin levels were normal.  SPEP drawn 6/16/23 showed a monoclonal spike in the gamma region measuring 2.3 gm/dL.  No IEP or quantitative immunoglobulins done.    She was seen as a new patient 8/14/23 for a hematology evaluation.  She lives in an assisted living complex with her .  She has significant dementia and could not give a reliable history.  Family reported no recent illnesses or infections.  No appetite changes or weight loss.  No fever, chills or night sweats.  No complaints of bone pain.  An initial period of observation was recommended.  Her monoclonal protein remained stable on follow-up testing.    Interval History  She returns to clinic today for a six-month follow-up visit accompanied by her daughter.  She I s ambulatory with standby assistance.  She continues to reside with her  in an assisted living complex.  Her Risperdal was recently changed to Seroquel.  She has since been experiencing dysarthria (mumbled, garbled, repetitive speech).  Her family has been in touch with the prescribing provider.  Patient is unable to answer any questions today.  Her family denies any recent illnesses, infections, or  hospitalizations.  Follow-up laboratory testing showed interval increase in her monoclonal IgG protein and IgG level.  No progressive renal insufficiency, anemia or hypercalcemia.  She has no symptoms of bone pain.  She has lost weight but broke a tooth and has been undergoing some dental work.      Review of Systems " "  Constitutional:  Positive for unexpected weight change. Negative for appetite change, fatigue and fever.   HENT:  Negative for mouth sores, sore throat and trouble swallowing.    Eyes: Negative.    Respiratory:  Negative for cough and shortness of breath.    Cardiovascular:  Negative for chest pain, palpitations and leg swelling.   Gastrointestinal:  Negative for abdominal distention, abdominal pain, constipation, diarrhea, nausea and vomiting.   Genitourinary:  Negative for dysuria, frequency and urgency.   Musculoskeletal:  Positive for arthralgias. Negative for back pain.   Integumentary:  Negative for pallor and rash.   Neurological:  Positive for memory loss. Negative for dizziness, weakness, numbness and headaches.   Hematological:  Negative for adenopathy. Does not bruise/bleed easily.   Psychiatric/Behavioral:  Positive for agitation. The patient is nervous/anxious.         Speech changes       PMHx:  Hypothyroidism, osteoarthritis, migraines, dementia, CRI  PSHx:  Tonsils  SH:  Lifetime nonsmoker, no significant alcohol use.  Lives in assisted living in [place] with her .  FH:  No family history of cancer.    Objective:        /69   Pulse 87   Temp 98 °F (36.7 °C)   Resp 15   Ht 5' 4" (1.626 m)   Wt 64.1 kg (141 lb 4.8 oz)   SpO2 97%   BMI 24.25 kg/m²    Physical Exam  Constitutional:       Comments: Elderly, well-developed white female.  Mild agitation and dysarthria.   HENT:      Head: Normocephalic.      Mouth/Throat:      Mouth: Mucous membranes are moist.      Pharynx: Oropharynx is clear. No posterior oropharyngeal erythema.      Comments: Dental issues  Eyes:      Extraocular Movements: Extraocular movements intact.      Conjunctiva/sclera: Conjunctivae normal.      Pupils: Pupils are equal, round, and reactive to light.   Cardiovascular:      Rate and Rhythm: Normal rate and regular rhythm.      Heart sounds: No murmur heard.  Pulmonary:      Comments: Lungs clear to " auscultation  Abdominal:      General: Bowel sounds are normal. There is no distension.      Palpations: Abdomen is soft.      Tenderness: There is no abdominal tenderness.      Comments: No palpable masses or organomegaly   Musculoskeletal:         General: Swelling (Trace ankle edema) present. No tenderness. Normal range of motion.      Cervical back: Neck supple. No tenderness.   Lymphadenopathy:      Cervical: No cervical adenopathy.   Skin:     General: Skin is warm and dry.      Findings: No rash.   Neurological:      Mental Status: She is alert.      Cranial Nerves: No cranial nerve deficit.      Motor: No weakness.      Comments: Dysarthria (garbled, mumbled, repetitive speech)   Psychiatric:      Comments: Mild agitation       ECOG SCORE    2 - Capable of all selfcare but unable to carry out any work activities, active > 50% of hours          LABORATORY  Recent Results (from the past 168 hour(s))   Immunoglobulins (IgG, IgA, IgM) Quantitative    Collection Time: 09/19/24 11:32 AM   Result Value Ref Range    IgG Level 4,471.00 (H) 522.00 - 1,631.00 mg/dL    IgA Level 6.0 (L) 69.0 - 517.0 mg/dL    IgM Level 21.0 (L) 33.0 - 293.0 mg/dL   Beta-2 Microglobulin, Serum    Collection Time: 09/19/24 11:32 AM   Result Value Ref Range    Beta 2 Microglobulin 2.920 (H) 0.970 - 2.640 mg/L   Immunoglobulin Free LT Chains Blood    Collection Time: 09/19/24 11:32 AM   Result Value Ref Range    Kappa Free Light Chain 11.2 (H) 0.3300 - 1.94 mg/dL    Lambda Free Light Chain 0.3700 (L) 0.5700 - 2.63 mg/dL    Kappa/Lambda FLC Ratio 30.3 (H) 0.2600 - 1.65   Comprehensive Metabolic Panel    Collection Time: 09/19/24 11:32 AM   Result Value Ref Range    Sodium 139 136 - 145 mmol/L    Potassium 4.1 3.5 - 5.1 mmol/L    Chloride 110 98 - 111 mmol/L    CO2 25 23 - 31 mmol/L    Glucose 90 75 - 121 mg/dL    Blood Urea Nitrogen 25.2 (H) 9.8 - 20.1 mg/dL    Creatinine 0.86 0.55 - 1.02 mg/dL    Calcium 9.1 8.4 - 10.2 mg/dL    Protein Total  9.1 (H) 5.8 - 7.6 gm/dL    Albumin 3.5 3.4 - 4.8 g/dL    Globulin 5.6 (H) 2.4 - 3.5 gm/dL    Albumin/Globulin Ratio 0.6 (L) 1.1 - 2.0 ratio    Bilirubin Total 0.6 <=1.5 mg/dL    ALP 63 40 - 150 unit/L    ALT 10 0 - 55 unit/L    AST 17 5 - 34 unit/L    eGFR >60 mL/min/1.73/m2    Anion Gap 4.0 mEq/L    BUN/Creatinine Ratio 29    Immunofixation & Protein Electrophoresis    Collection Time: 09/19/24 11:32 AM   Result Value Ref Range    Protein Total 9.3 (H) 5.8 - 7.6 gm/dL    Albumin 3.9 3.4 - 4.8 g/dL    Globulin 5.4 (H) 2.4 - 3.5 gm/dL    Albumin/Globulin Ratio 0.7 (L) 1.1 - 2.0 ratio    Albumin, SPEP 41.56 (L) 48.1 - 59.5    Alpha 1 Glob % 3.21 2.3 - 4.9    Alpha 1 Glob 0.30 0.00 - 0.40 gm/dl    Alpha 2 Glob% 10.15 6.9 - 13    Alpha 2 Glob 0.94 0.40 - 1.00 gm/dl    Beta Glob % 11.40 (L) 13.8 - 19.7    Beta Glob 1.06 0.70 - 1.30 gm/dl    Gamma Globulin % 33.68 (H) 10.1 - 21.9    Gamma Globulin 3.13 (H) 0.40 - 1.80 gm/dl    M Lucas % 26.12     M Lucas 2.43 gm/dl   CBC with Differential    Collection Time: 09/19/24 11:32 AM   Result Value Ref Range    WBC 4.88 4.50 - 11.50 x10(3)/mcL    RBC 3.45 (L) 4.20 - 5.40 x10(6)/mcL    Hgb 10.9 (L) 12.0 - 16.0 g/dL    Hct 33.1 (L) 37.0 - 47.0 %    MCV 95.9 (H) 80.0 - 94.0 fL    MCH 31.6 (H) 27.0 - 31.0 pg    MCHC 32.9 (L) 33.0 - 36.0 g/dL    RDW 14.1 11.5 - 17.0 %    Platelet 230 130 - 400 x10(3)/mcL    MPV 8.7 7.4 - 10.4 fL    Neut % 44.3 %    Lymph % 46.3 %    Mono % 7.2 %    Eos % 1.6 %    Basophil % 0.4 %    Lymph # 2.26 0.6 - 4.6 x10(3)/mcL    Neut # 2.16 2.1 - 9.2 x10(3)/mcL    Mono # 0.35 0.1 - 1.3 x10(3)/mcL    Eos # 0.08 0 - 0.9 x10(3)/mcL    Baso # 0.02 <=0.2 x10(3)/mcL    IG# 0.01 0 - 0.04 x10(3)/mcL    IG% 0.2 %   Pathologist Interpretation    Collection Time: 09/19/24 11:32 AM   Result Value Ref Range    Pathology Review       The SPE shows a prominent M-spike in the gamma globulin region (2.43 g/dL).    The DAE demonstrates an IgG kappa monoclonal band.    Peter  SAE Ogden., Ph.D.                         8/14/23 11/27/23 4/18/24 9/19/24  IgG          3438        2908         3005      4471  IgA              10              8             10         6  IgM              26            29            31         21  MS            1.84         1.60         1.64       2.43  B2M          2.78         2.45         2.83       2.92  FKLC        5.76         4.85         6.00       11.2  FLLC         0.79         0.60         0.85       0.37  Ratio         7.29         8.08         7.06       30.3      Assessment:   Monoclonal gammopathy of unknown significance  Anemia of chronic renal disease  Chronic renal insufficiency      Plan:   Laboratory shows interval increase in the serum IgG level and monoclonal protein.  She does not have associated hypercalcemia, or progressive anemia and renal insufficiency.  Her performance status is very poor, especially with recent cognitive changes that are felt due to medications.  Family does not want aggressive treatment should that be warranted, or anything that may negatively affect her overall quality of life.  Discussed at length with the patient's daughter today.  Continue observation with repeat laboratory studies in 4 months.  Written explanative provided to daughter so she can share with her siblings.  All questions answered to the satisfaction of the patient.    KENZIE MURPHY, FNP-C  [facility] [place]       Other Physicians  Dr. Juli Logan

## 2024-11-26 ENCOUNTER — OFFICE VISIT (OUTPATIENT)
Dept: URGENT CARE | Facility: CLINIC | Age: 89
End: 2024-11-26
Payer: MEDICARE

## 2024-11-26 VITALS
OXYGEN SATURATION: 95 % | WEIGHT: 141 LBS | DIASTOLIC BLOOD PRESSURE: 60 MMHG | SYSTOLIC BLOOD PRESSURE: 95 MMHG | BODY MASS INDEX: 24.07 KG/M2 | HEIGHT: 64 IN | RESPIRATION RATE: 20 BRPM | HEART RATE: 99 BPM | TEMPERATURE: 98 F

## 2024-11-26 DIAGNOSIS — R11.2 NAUSEA AND VOMITING, UNSPECIFIED VOMITING TYPE: Primary | ICD-10-CM

## 2024-11-26 DIAGNOSIS — I95.9 HYPOTENSION, UNSPECIFIED HYPOTENSION TYPE: ICD-10-CM

## 2024-11-26 DIAGNOSIS — R19.7 DIARRHEA, UNSPECIFIED TYPE: ICD-10-CM

## 2024-11-26 PROCEDURE — 99213 OFFICE O/P EST LOW 20 MIN: CPT | Mod: ,,, | Performed by: FAMILY MEDICINE

## 2024-11-27 NOTE — PROGRESS NOTES
"Subjective:      Patient ID: Karina Chun is a 89 y.o. female.    Vitals:  height is 5' 4" (1.626 m) and weight is 64 kg (141 lb). Her tympanic temperature is 98.3 °F (36.8 °C). Her blood pressure is 95/60 and her pulse is 99. Her respiration is 20 and oxygen saturation is 95%.     Chief Complaint: Emesis     Patient is a 89 y.o. female with history of dementia who presents to urgent care with complaints of vomiting and diarrhea x1 days.  She resides at assisted living, who reports that she has had about 3-5 episodes of vomiting and loose stools.  Patient is accompanied by her daughter, who can not answer many questions about the patient's presenting illness.  It is unknown if if there is any blood or mucus in the patient's stool.  It is unknown when the last time the patient ate or drank anything.  When asked if patient is having any abdominal pain, she is unable to articulate an answer. Patient's daughter does report that she was having difficulty walking into the clinic due to weakness. Patient denies fever.        Constitution: Positive for fatigue. Negative for chills, sweating and fever.   HENT: Negative.     Neck: neck negative.   Cardiovascular: Negative.    Eyes: Negative.    Respiratory: Negative.     Gastrointestinal:  Positive for nausea, vomiting and diarrhea. Negative for constipation.   Genitourinary: Negative.    Musculoskeletal: Negative.    Skin: Negative.    Allergic/Immunologic: Negative.    Neurological:  Positive for disorientation.   Psychiatric/Behavioral:  Positive for disorientation and confusion.       Objective:     Physical Exam   Constitutional:      Comments:Patient is disoriented consistent with dementia     HENT:   Head: Atraumatic.   Nose: Nose normal.   Eyes: Conjunctivae are normal.   Pulmonary/Chest: Effort normal.   Abdominal: Normal appearance and bowel sounds are normal. She exhibits no distension. There is no abdominal tenderness. There is no rebound, no guarding, no left " CVA tenderness and no right CVA tenderness.   Musculoskeletal: Normal range of motion.         General: Normal range of motion.   Neurological: She is alert.   Skin: Skin is warm, dry, pale and no rash. jaundice  Nursing note and vitals reviewed.         Previous History      Review of patient's allergies indicates:   Allergen Reactions    Codeine phosphate (bulk)     Moxifloxacin        Past Medical History:   Diagnosis Date    Migraines     Thyroid disease      Current Outpatient Medications   Medication Instructions    aspirin 81 mg    cholecalciferol (vitamin D3) (VITAMIN D3) 5,000 Units, Daily    cyanocobalamin (VITAMIN B-12) 2,000 mcg, Daily    cyclobenzaprine (FLEXERIL) 10 mg, 2 times daily PRN    diclofenac (VOLTAREN) 75 mg, 2 times daily    fluticasone propionate (FLONASE) 50 mcg/actuation nasal spray 2 sprays    krill-om-3-dha-epa-phospho-ast (KRILL OIL) 460-635-87-75 mg Cap Take by mouth.    levocetirizine (XYZAL) 5 mg, Nightly    magnesium 30 mg Tab Once    mirtazapine (REMERON) 15 mg, Nightly    pantoprazole (PROTONIX) 40 mg, 2 times daily    RESVERATROL ORAL Take by mouth.    risperiDONE (RISPERDAL) 0.5 MG Tab Take by mouth.    sucralfate (CARAFATE) 1 g    sumatriptan (IMITREX) 50 mg, Every 2 hours PRN    traMADoL (ULTRAM) 50 mg, Daily PRN    traZODone (DESYREL) 50 MG tablet Oral    tumeric-ging-olive-oreg-capryl 100 mg-150 mg- 50 mg-150 mg Cap Take by mouth.    UNABLE TO FIND medication name: CBD pill    UNABLE TO FIND medication name: liz quiñonez mushroom     Past Surgical History:   Procedure Laterality Date    TONSILLECTOMY       Family History   Problem Relation Name Age of Onset    Hypertension Mother      Hypertension Father      No Known Problems Sister      No Known Problems Brother      Cancer Neg Hx         Social History     Tobacco Use    Smoking status: Never    Smokeless tobacco: Never   Substance Use Topics    Alcohol use: Never    Drug use: Never        Physical Exam      Vital Signs  "Reviewed   BP 95/60 (BP Location: Right arm, Patient Position: Sitting)   Pulse 99   Temp 98.3 °F (36.8 °C) (Tympanic)   Resp 20   Ht 5' 4" (1.626 m)   Wt 64 kg (141 lb)   SpO2 95%   BMI 24.20 kg/m²        Procedures    Procedures     Labs     Results for orders placed or performed in visit on 09/19/24   Immunoglobulins (IgG, IgA, IgM) Quantitative    Collection Time: 09/19/24 11:32 AM   Result Value Ref Range    IgG Level 4,471.00 (H) 522.00 - 1,631.00 mg/dL    IgA Level 6.0 (L) 69.0 - 517.0 mg/dL    IgM Level 21.0 (L) 33.0 - 293.0 mg/dL   Beta-2 Microglobulin, Serum    Collection Time: 09/19/24 11:32 AM   Result Value Ref Range    Beta 2 Microglobulin 2.920 (H) 0.970 - 2.640 mg/L   Immunoglobulin Free LT Chains Blood    Collection Time: 09/19/24 11:32 AM   Result Value Ref Range    Kappa Free Light Chain 11.2 (H) 0.3300 - 1.94 mg/dL    Lambda Free Light Chain 0.3700 (L) 0.5700 - 2.63 mg/dL    Kappa/Lambda FLC Ratio 30.3 (H) 0.2600 - 1.65   Comprehensive Metabolic Panel    Collection Time: 09/19/24 11:32 AM   Result Value Ref Range    Sodium 139 136 - 145 mmol/L    Potassium 4.1 3.5 - 5.1 mmol/L    Chloride 110 98 - 111 mmol/L    CO2 25 23 - 31 mmol/L    Glucose 90 75 - 121 mg/dL    Blood Urea Nitrogen 25.2 (H) 9.8 - 20.1 mg/dL    Creatinine 0.86 0.55 - 1.02 mg/dL    Calcium 9.1 8.4 - 10.2 mg/dL    Protein Total 9.1 (H) 5.8 - 7.6 gm/dL    Albumin 3.5 3.4 - 4.8 g/dL    Globulin 5.6 (H) 2.4 - 3.5 gm/dL    Albumin/Globulin Ratio 0.6 (L) 1.1 - 2.0 ratio    Bilirubin Total 0.6 <=1.5 mg/dL    ALP 63 40 - 150 unit/L    ALT 10 0 - 55 unit/L    AST 17 5 - 34 unit/L    eGFR >60 mL/min/1.73/m2    Anion Gap 4.0 mEq/L    BUN/Creatinine Ratio 29    Immunofixation & Protein Electrophoresis    Collection Time: 09/19/24 11:32 AM   Result Value Ref Range    Protein Total 9.3 (H) 5.8 - 7.6 gm/dL    Albumin 3.9 3.4 - 4.8 g/dL    Globulin 5.4 (H) 2.4 - 3.5 gm/dL    Albumin/Globulin Ratio 0.7 (L) 1.1 - 2.0 ratio    Albumin, SPEP " 41.56 (L) 48.1 - 59.5    Alpha 1 Glob % 3.21 2.3 - 4.9    Alpha 1 Glob 0.30 0.00 - 0.40 gm/dl    Alpha 2 Glob% 10.15 6.9 - 13    Alpha 2 Glob 0.94 0.40 - 1.00 gm/dl    Beta Glob % 11.40 (L) 13.8 - 19.7    Beta Glob 1.06 0.70 - 1.30 gm/dl    Gamma Globulin % 33.68 (H) 10.1 - 21.9    Gamma Globulin 3.13 (H) 0.40 - 1.80 gm/dl    M Lucas % 26.12     M Lucas 2.43 gm/dl   CBC with Differential    Collection Time: 09/19/24 11:32 AM   Result Value Ref Range    WBC 4.88 4.50 - 11.50 x10(3)/mcL    RBC 3.45 (L) 4.20 - 5.40 x10(6)/mcL    Hgb 10.9 (L) 12.0 - 16.0 g/dL    Hct 33.1 (L) 37.0 - 47.0 %    MCV 95.9 (H) 80.0 - 94.0 fL    MCH 31.6 (H) 27.0 - 31.0 pg    MCHC 32.9 (L) 33.0 - 36.0 g/dL    RDW 14.1 11.5 - 17.0 %    Platelet 230 130 - 400 x10(3)/mcL    MPV 8.7 7.4 - 10.4 fL    Neut % 44.3 %    Lymph % 46.3 %    Mono % 7.2 %    Eos % 1.6 %    Basophil % 0.4 %    Lymph # 2.26 0.6 - 4.6 x10(3)/mcL    Neut # 2.16 2.1 - 9.2 x10(3)/mcL    Mono # 0.35 0.1 - 1.3 x10(3)/mcL    Eos # 0.08 0 - 0.9 x10(3)/mcL    Baso # 0.02 <=0.2 x10(3)/mcL    IG# 0.01 0 - 0.04 x10(3)/mcL    IG% 0.2 %   Pathologist Interpretation    Collection Time: 09/19/24 11:32 AM   Result Value Ref Range    Pathology Review       The SPE shows a prominent M-spike in the gamma globulin region (2.43 g/dL).    The DAE demonstrates an IgG kappa monoclonal band.    Wilfredo Ogden M.D., Ph.D.       Assessment:     1. Nausea and vomiting, unspecified vomiting type    2. Diarrhea, unspecified type    3. Hypotension, unspecified hypotension type        Plan:   As we discussed, it is recommended that you present to the ER now for further evaluation to prevent a delay in care.   Transport via EMS was offered and advised but patient/family declines despite informed risks including death.  Opts for private transport via personal vehicle.      Nausea and vomiting, unspecified vomiting type    Diarrhea, unspecified type    Hypotension, unspecified hypotension type

## 2025-01-17 ENCOUNTER — LAB VISIT (OUTPATIENT)
Dept: LAB | Facility: HOSPITAL | Age: OVER 89
End: 2025-01-17
Attending: INTERNAL MEDICINE
Payer: MEDICARE

## 2025-01-17 DIAGNOSIS — D47.2 MONOCLONAL GAMMOPATHY: ICD-10-CM

## 2025-01-17 LAB
ALBUMIN SERPL-MCNC: 3.5 G/DL (ref 3.4–4.8)
ALBUMIN/GLOB SERPL: 0.6 RATIO (ref 1.1–2)
ALP SERPL-CCNC: 90 UNIT/L (ref 40–150)
ALT SERPL-CCNC: 10 UNIT/L (ref 0–55)
ANION GAP SERPL CALC-SCNC: 8 MEQ/L
AST SERPL-CCNC: 18 UNIT/L (ref 5–34)
B2 MICROGLOB SERPL-MCNC: 3.27 MG/L (ref 0.97–2.64)
BASOPHILS # BLD AUTO: 0.03 X10(3)/MCL
BASOPHILS NFR BLD AUTO: 0.7 %
BILIRUB SERPL-MCNC: 0.6 MG/DL
BUN SERPL-MCNC: 28.2 MG/DL (ref 9.8–20.1)
CALCIUM SERPL-MCNC: 9 MG/DL (ref 8.4–10.2)
CHLORIDE SERPL-SCNC: 107 MMOL/L (ref 98–111)
CO2 SERPL-SCNC: 23 MMOL/L (ref 23–31)
CREAT SERPL-MCNC: 0.94 MG/DL (ref 0.55–1.02)
CREAT/UREA NIT SERPL: 30
EOSINOPHIL # BLD AUTO: 0.04 X10(3)/MCL (ref 0–0.9)
EOSINOPHIL NFR BLD AUTO: 0.9 %
ERYTHROCYTE [DISTWIDTH] IN BLOOD BY AUTOMATED COUNT: 13.5 % (ref 11.5–17)
GFR SERPLBLD CREATININE-BSD FMLA CKD-EPI: 57 ML/MIN/1.73/M2
GLOBULIN SER-MCNC: 5.5 GM/DL (ref 2.4–3.5)
GLUCOSE SERPL-MCNC: 106 MG/DL (ref 75–121)
HCT VFR BLD AUTO: 30 % (ref 37–47)
HGB BLD-MCNC: 10 G/DL (ref 12–16)
IGA SERPL-MCNC: 6 MG/DL (ref 69–517)
IGG SERPL-MCNC: 3790 MG/DL (ref 522–1631)
IGM SERPL-MCNC: 18 MG/DL (ref 33–293)
IMM GRANULOCYTES # BLD AUTO: 0.01 X10(3)/MCL (ref 0–0.04)
IMM GRANULOCYTES NFR BLD AUTO: 0.2 %
LYMPHOCYTES # BLD AUTO: 1.9 X10(3)/MCL (ref 0.6–4.6)
LYMPHOCYTES NFR BLD AUTO: 42.8 %
MCH RBC QN AUTO: 32.5 PG (ref 27–31)
MCHC RBC AUTO-ENTMCNC: 33.3 G/DL (ref 33–36)
MCV RBC AUTO: 97.4 FL (ref 80–94)
MONOCYTES # BLD AUTO: 0.24 X10(3)/MCL (ref 0.1–1.3)
MONOCYTES NFR BLD AUTO: 5.4 %
NEUTROPHILS # BLD AUTO: 2.22 X10(3)/MCL (ref 2.1–9.2)
NEUTROPHILS NFR BLD AUTO: 50 %
PLATELET # BLD AUTO: 244 X10(3)/MCL (ref 130–400)
PMV BLD AUTO: 8.7 FL (ref 7.4–10.4)
POTASSIUM SERPL-SCNC: 3.8 MMOL/L (ref 3.5–5.1)
PROT SERPL-MCNC: 9 GM/DL (ref 5.8–7.6)
RBC # BLD AUTO: 3.08 X10(6)/MCL (ref 4.2–5.4)
SODIUM SERPL-SCNC: 138 MMOL/L (ref 136–145)
WBC # BLD AUTO: 4.44 X10(3)/MCL (ref 4.5–11.5)

## 2025-01-17 PROCEDURE — 36415 COLL VENOUS BLD VENIPUNCTURE: CPT

## 2025-01-17 PROCEDURE — 82784 ASSAY IGA/IGD/IGG/IGM EACH: CPT

## 2025-01-17 PROCEDURE — 85025 COMPLETE CBC W/AUTO DIFF WBC: CPT

## 2025-01-17 PROCEDURE — 86334 IMMUNOFIX E-PHORESIS SERUM: CPT

## 2025-01-17 PROCEDURE — 80053 COMPREHEN METABOLIC PANEL: CPT

## 2025-01-17 PROCEDURE — 86146 BETA-2 GLYCOPROTEIN ANTIBODY: CPT

## 2025-01-17 PROCEDURE — 83521 IG LIGHT CHAINS FREE EACH: CPT

## 2025-01-17 PROCEDURE — 82232 ASSAY OF BETA-2 PROTEIN: CPT

## 2025-01-18 NOTE — PROGRESS NOTES
Subjective:       Patient ID: Karina Chun is a 89 y.o. female.    Chief Complaint:  Her dementia is getting worse.     Diagnosis: Monoclonal gammopathy    Current Treatment: Observation    Clinical History:  Patient had a wellness visit 5/23/23.  Laboratory testing showed mild anemia with a hemoglobin of 10.8 and hematocrit 33.8 with normal red cell indices.  Total protein was elevated at 8.7 with a globulin fraction of 4.7.  BUN 18 and creatinine 1.12, serum calcium 9.0.  B12 and serum ferritin levels were normal.  SPEP drawn 6/16/23 showed a monoclonal spike in the gamma region measuring 2.3 gm/dL.  No IEP or quantitative immunoglobulins done.    She was seen as a new patient 8/14/23 for a hematology evaluation.  She lives in an assisted living complex with her .  She has significant dementia and could not give a reliable history.  Family reported no recent illnesses or infections.  No appetite changes or weight loss.  No fever, chills or night sweats.  No complaints of bone pain.  An initial period of observation was recommended.  Her monoclonal protein remained stable on follow-up testing.    Interval History  She returns to the office today for a four-month follow-up visit accompanied by her daughter.  Her daughter reports that her dementia is progressing significantly.  She is still stain with her  in an assisted living complex.  They have made adjustments to her medication without significant improvement.  She is not aware of the reason for her visit today or able to answer simple questions.  Laboratory testing shows a stable monoclonal IgG level.  She has no evidence of progressive renal insufficiency, anemia or hypercalcemia.  Her testing has remained stable over the past year and a half without evidence of progression.  Even if she did have significant progression, I do not believe she could tolerate any therapy.  Her daughter is in agreement.      Review of Systems   Unable to perform  "ROS: Dementia       PMHx:  Hypothyroidism, osteoarthritis, migraines, dementia, CRI  PSHx:  Tonsils  SH:  Lifetime nonsmoker, no significant alcohol use.  Lives in assisted living in [place] with her .  FH:  No family history of cancer.    Objective:        /75   Pulse 83   Temp 98.1 °F (36.7 °C)   Resp 18   Ht 5' 4" (1.626 m)   Wt 58.9 kg (129 lb 14.4 oz)   SpO2 96%   BMI 22.30 kg/m²    Physical Exam  Constitutional:       Comments: Elderly, thin, confused WF in NAD   HENT:      Head: Normocephalic.      Mouth/Throat:      Pharynx: Oropharynx is clear. No posterior oropharyngeal erythema.   Eyes:      General: No scleral icterus.     Pupils: Pupils are equal, round, and reactive to light.   Cardiovascular:      Rate and Rhythm: Normal rate and regular rhythm.      Heart sounds: No murmur heard.  Pulmonary:      Comments: Lungs clear to auscultation  Abdominal:      General: Bowel sounds are normal. There is no distension.      Palpations: Abdomen is soft.      Tenderness: There is no abdominal tenderness.      Comments: No palpable masses or organomegaly   Musculoskeletal:         General: Swelling (Trace ankle edema) present. No tenderness.      Cervical back: Neck supple. No tenderness.   Skin:     General: Skin is warm and dry.      Findings: No rash.   Neurological:      Mental Status: She is disoriented.      Cranial Nerves: No cranial nerve deficit.      Motor: No weakness.      Comments: Dysarthria (garbled, mumbled, repetitive speech)   Psychiatric:      Comments: Mild agitation       ECOG SCORE    3 - Capable of only limited selfcare, confined to bed or chair more than 50% of waking hours          LABORATORY  No results found for this or any previous visit (from the past week).                       8/14/23 11/27/23 4/18/24 9/19/24 1/17/25  IgG          3438        2908         3005      4471         3790  IgA              10              8             10            6          "      6  IgM              26            29            31           21             18  MS            1.84         1.60         1.64        2.43          2.19  B2M          2.78         2.45         2.83        2.92          3.27  FKLC        5.76         4.85         6.00        11.2           10.8  FLLC         0.79         0.60         0.85        0.37          0.42  Ratio         7.29         8.08         7.06        30.3          25.7      Assessment:   Monoclonal gammopathy of unknown significance  Anemia of chronic renal disease  Chronic renal insufficiency  Dementia      Plan:   Laboratory testing shows a persistent but stable monoclonal IgG kappa protein without significant progression over the past year and a half.  She has progressive dementia and would not be inappropriate candidate for treatment of any kind in the event of progression or transformation to myeloma.  Do not recommend any additional testing going forward.  Her daughter is in complete agreement.  She will be placed in a pending status in my clinic.  I will be happy to re-evaluate her in the future if I can be of any assistance in her care.      LARS DIOR MD      Other Physicians  Dr. [facility]

## 2025-01-20 LAB
ALBUMIN % SPEP (OHS): 39.81 (ref 48.1–59.5)
ALBUMIN SERPL-MCNC: 3.5 G/DL (ref 3.4–4.8)
ALBUMIN/GLOB SERPL: 0.7 RATIO (ref 1.1–2)
ALPHA 1 GLOB (OHS): 0.28 GM/DL (ref 0–0.4)
ALPHA 1 GLOB% (OHS): 3.24 (ref 2.3–4.9)
ALPHA 2 GLOB % (OHS): 10.92 (ref 6.9–13)
ALPHA 2 GLOB (OHS): 0.95 GM/DL (ref 0.4–1)
BETA GLOB (OHS): 0.96 GM/DL (ref 0.7–1.3)
BETA GLOB% (OHS): 11 (ref 13.8–19.7)
GAMMA GLOBULIN % (OHS): 35.03 (ref 10.1–21.9)
GAMMA GLOBULIN (OHS): 3.05 GM/DL (ref 0.4–1.8)
GLOBULIN SER-MCNC: 5.2 GM/DL (ref 2.4–3.5)
KAPPA LC FREE SER NEPH-MCNC: 10.8 MG/DL (ref 0.33–1.94)
KAPPA LC FREE/LAMBDA FREE SER NEPH: 25.7 {RATIO} (ref 0.26–1.65)
LAMBDA LC FREE SERPL-MCNC: 0.42 MG/DL (ref 0.57–2.63)
M SPIKE % (OHS): 25.13
M SPIKE (OHS): 2.19 GM/DL
PATH REV: NORMAL
PROT SERPL-MCNC: 8.7 GM/DL (ref 5.8–7.6)

## 2025-01-29 ENCOUNTER — OFFICE VISIT (OUTPATIENT)
Dept: HEMATOLOGY/ONCOLOGY | Facility: CLINIC | Age: OVER 89
End: 2025-01-29
Payer: MEDICARE

## 2025-01-29 VITALS
HEIGHT: 64 IN | OXYGEN SATURATION: 96 % | RESPIRATION RATE: 18 BRPM | WEIGHT: 129.88 LBS | TEMPERATURE: 98 F | DIASTOLIC BLOOD PRESSURE: 75 MMHG | SYSTOLIC BLOOD PRESSURE: 126 MMHG | BODY MASS INDEX: 22.17 KG/M2 | HEART RATE: 83 BPM

## 2025-01-29 DIAGNOSIS — D47.2 MONOCLONAL GAMMOPATHY: Primary | ICD-10-CM

## 2025-01-29 PROCEDURE — 1101F PT FALLS ASSESS-DOCD LE1/YR: CPT | Mod: CPTII,S$GLB,, | Performed by: INTERNAL MEDICINE

## 2025-01-29 PROCEDURE — 1126F AMNT PAIN NOTED NONE PRSNT: CPT | Mod: CPTII,S$GLB,, | Performed by: INTERNAL MEDICINE

## 2025-01-29 PROCEDURE — 1159F MED LIST DOCD IN RCRD: CPT | Mod: CPTII,S$GLB,, | Performed by: INTERNAL MEDICINE

## 2025-01-29 PROCEDURE — 99999 PR PBB SHADOW E&M-EST. PATIENT-LVL IV: CPT | Mod: PBBFAC,,, | Performed by: INTERNAL MEDICINE

## 2025-01-29 PROCEDURE — 99213 OFFICE O/P EST LOW 20 MIN: CPT | Mod: S$GLB,,, | Performed by: INTERNAL MEDICINE

## 2025-01-29 PROCEDURE — 3288F FALL RISK ASSESSMENT DOCD: CPT | Mod: CPTII,S$GLB,, | Performed by: INTERNAL MEDICINE

## 2025-01-29 RX ORDER — QUETIAPINE FUMARATE 200 MG/1
200 TABLET, FILM COATED ORAL 2 TIMES DAILY
COMMUNITY
Start: 2025-01-28

## 2025-01-29 RX ORDER — MEMANTINE HYDROCHLORIDE 5 MG/1
TABLET ORAL
COMMUNITY
Start: 2025-01-28
